# Patient Record
Sex: MALE | Race: BLACK OR AFRICAN AMERICAN | Employment: UNEMPLOYED | URBAN - METROPOLITAN AREA
[De-identification: names, ages, dates, MRNs, and addresses within clinical notes are randomized per-mention and may not be internally consistent; named-entity substitution may affect disease eponyms.]

---

## 2024-07-08 ENCOUNTER — HOSPITAL ENCOUNTER (EMERGENCY)
Age: 54
Discharge: ANOTHER HOSPITAL | End: 2024-07-08
Attending: EMERGENCY MEDICINE
Payer: COMMERCIAL

## 2024-07-08 ENCOUNTER — APPOINTMENT (OUTPATIENT)
Dept: MRI IMAGING | Age: 54
End: 2024-07-08
Payer: COMMERCIAL

## 2024-07-08 ENCOUNTER — APPOINTMENT (OUTPATIENT)
Dept: CT IMAGING | Age: 54
End: 2024-07-08
Payer: COMMERCIAL

## 2024-07-08 ENCOUNTER — HOSPITAL ENCOUNTER (INPATIENT)
Age: 54
LOS: 3 days | Discharge: HOME | End: 2024-07-11
Attending: EMERGENCY MEDICINE | Admitting: PSYCHIATRY & NEUROLOGY
Payer: COMMERCIAL

## 2024-07-08 VITALS
TEMPERATURE: 97.5 F | DIASTOLIC BLOOD PRESSURE: 79 MMHG | BODY MASS INDEX: 21.87 KG/M2 | HEART RATE: 93 BPM | OXYGEN SATURATION: 96 % | WEIGHT: 165 LBS | SYSTOLIC BLOOD PRESSURE: 147 MMHG | RESPIRATION RATE: 19 BRPM | HEIGHT: 73 IN

## 2024-07-08 DIAGNOSIS — I63.9 ISCHEMIC STROKE (HCC): Primary | ICD-10-CM

## 2024-07-08 DIAGNOSIS — I63.512 CEREBROVASCULAR ACCIDENT (CVA) DUE TO OCCLUSION OF LEFT MIDDLE CEREBRAL ARTERY (HCC): Primary | ICD-10-CM

## 2024-07-08 DIAGNOSIS — I63.412 CEREBROVASCULAR ACCIDENT (CVA) DUE TO EMBOLISM OF LEFT MIDDLE CEREBRAL ARTERY (HCC): ICD-10-CM

## 2024-07-08 DIAGNOSIS — I63.412 CEREBROVASCULAR ACCIDENT (CVA) DUE TO EMBOLISM OF LEFT MIDDLE CEREBRAL ARTERY (HCC): Primary | ICD-10-CM

## 2024-07-08 PROBLEM — Z86.79 HISTORY OF ATRIAL FIBRILLATION: Status: ACTIVE | Noted: 2024-07-08

## 2024-07-08 PROBLEM — R79.1 SUBTHERAPEUTIC INTERNATIONAL NORMALIZED RATIO (INR): Status: ACTIVE | Noted: 2024-07-08

## 2024-07-08 LAB
AMPHET UR QL SCN: NEGATIVE
ANION GAP SERPL CALCULATED.3IONS-SCNC: 13 MMOL/L (ref 9–16)
ANION GAP SERPL CALCULATED.3IONS-SCNC: 13 MMOL/L (ref 9–17)
ANTI-XA UNFRAC HEPARIN: 0.2 IU/L
ANTI-XA UNFRAC HEPARIN: 0.3 IU/L
ANTI-XA UNFRAC HEPARIN: <0.1 IU/L
BARBITURATES UR QL SCN: NEGATIVE
BASOPHILS # BLD: <0.03 K/UL (ref 0–0.2)
BASOPHILS # BLD: <0.03 K/UL (ref 0–0.2)
BASOPHILS NFR BLD: 0 % (ref 0–2)
BASOPHILS NFR BLD: 0 % (ref 0–2)
BENZODIAZ UR QL: NEGATIVE
BUN BLD-MCNC: 7 MG/DL (ref 8–26)
BUN SERPL-MCNC: 9 MG/DL (ref 6–20)
BUN SERPL-MCNC: 9 MG/DL (ref 6–20)
BUN/CREAT SERPL: 7 (ref 9–20)
CA-I BLD-SCNC: 1.2 MMOL/L (ref 1.15–1.33)
CALCIUM SERPL-MCNC: 8.8 MG/DL (ref 8.6–10.4)
CALCIUM SERPL-MCNC: 9.1 MG/DL (ref 8.6–10.4)
CANNABINOIDS UR QL SCN: POSITIVE
CHLORIDE BLD-SCNC: 99 MMOL/L (ref 98–107)
CHLORIDE SERPL-SCNC: 101 MMOL/L (ref 98–107)
CHLORIDE SERPL-SCNC: 98 MMOL/L (ref 98–107)
CK SERPL-CCNC: 120 U/L (ref 39–308)
CO2 BLD CALC-SCNC: 30 MMOL/L (ref 22–30)
CO2 SERPL-SCNC: 24 MMOL/L (ref 20–31)
CO2 SERPL-SCNC: 26 MMOL/L (ref 20–31)
COCAINE UR QL SCN: POSITIVE
CREAT SERPL-MCNC: 1.3 MG/DL (ref 0.7–1.2)
CREAT SERPL-MCNC: 1.3 MG/DL (ref 0.7–1.2)
EGFR, POC: 80 ML/MIN/1.73M2
EOSINOPHIL # BLD: 0.06 K/UL (ref 0–0.44)
EOSINOPHIL # BLD: 0.08 K/UL (ref 0–0.44)
EOSINOPHILS RELATIVE PERCENT: 1 % (ref 1–4)
EOSINOPHILS RELATIVE PERCENT: 2 % (ref 1–4)
ERYTHROCYTE [DISTWIDTH] IN BLOOD BY AUTOMATED COUNT: 16.4 % (ref 11.8–14.4)
ERYTHROCYTE [DISTWIDTH] IN BLOOD BY AUTOMATED COUNT: 17.1 % (ref 11.8–14.4)
ERYTHROCYTE [DISTWIDTH] IN BLOOD BY AUTOMATED COUNT: 17.3 % (ref 11.8–14.4)
FENTANYL UR QL: NEGATIVE
GFR, ESTIMATED: 66 ML/MIN/1.73M2
GFR, ESTIMATED: 68 ML/MIN/1.73M2
GLUCOSE BLD-MCNC: 118 MG/DL (ref 74–100)
GLUCOSE BLD-MCNC: 147 MG/DL (ref 75–110)
GLUCOSE SERPL-MCNC: 124 MG/DL (ref 74–99)
GLUCOSE SERPL-MCNC: 130 MG/DL (ref 70–99)
HCO3 VENOUS: 29.9 MMOL/L (ref 22–29)
HCT VFR BLD AUTO: 47.8 % (ref 40.7–50.3)
HCT VFR BLD AUTO: 48.6 % (ref 40.7–50.3)
HCT VFR BLD AUTO: 49.9 % (ref 40.7–50.3)
HCT VFR BLD AUTO: 51 % (ref 41–53)
HGB BLD-MCNC: 15.2 G/DL (ref 13–17)
HGB BLD-MCNC: 15.6 G/DL (ref 13–17)
HGB BLD-MCNC: 15.8 G/DL (ref 13–17)
IMM GRANULOCYTES # BLD AUTO: 0.01 K/UL (ref 0–0.3)
IMM GRANULOCYTES # BLD AUTO: <0.03 K/UL (ref 0–0.3)
IMM GRANULOCYTES NFR BLD: 0 %
IMM GRANULOCYTES NFR BLD: 0 %
INR PPP: 1.1
INR PPP: 1.1
INR PPP: 1.2
LYMPHOCYTES NFR BLD: 0.81 K/UL (ref 1.1–3.7)
LYMPHOCYTES NFR BLD: 0.83 K/UL (ref 1.1–3.7)
LYMPHOCYTES RELATIVE PERCENT: 14 % (ref 24–43)
LYMPHOCYTES RELATIVE PERCENT: 16 % (ref 24–43)
MCH RBC QN AUTO: 25.4 PG (ref 25.2–33.5)
MCH RBC QN AUTO: 25.5 PG (ref 25.2–33.5)
MCH RBC QN AUTO: 25.5 PG (ref 25.2–33.5)
MCHC RBC AUTO-ENTMCNC: 31.7 G/DL (ref 28.4–34.8)
MCHC RBC AUTO-ENTMCNC: 31.8 G/DL (ref 28.4–34.8)
MCHC RBC AUTO-ENTMCNC: 32.1 G/DL (ref 28.4–34.8)
MCV RBC AUTO: 79.5 FL (ref 82.6–102.9)
MCV RBC AUTO: 80.3 FL (ref 82.6–102.9)
MCV RBC AUTO: 80.4 FL (ref 82.6–102.9)
METHADONE UR QL: NEGATIVE
MONOCYTES NFR BLD: 0.28 K/UL (ref 0.1–1.2)
MONOCYTES NFR BLD: 0.35 K/UL (ref 0.1–1.2)
MONOCYTES NFR BLD: 6 % (ref 3–12)
MONOCYTES NFR BLD: 6 % (ref 3–12)
MYOGLOBIN SERPL-MCNC: 72 NG/ML (ref 28–72)
NEUTROPHILS NFR BLD: 76 % (ref 36–65)
NEUTROPHILS NFR BLD: 79 % (ref 36–65)
NEUTS SEG NFR BLD: 3.94 K/UL (ref 1.5–8.1)
NEUTS SEG NFR BLD: 4.88 K/UL (ref 1.5–8.1)
NRBC BLD-RTO: 0 PER 100 WBC
O2 SAT, VEN: 69.8 % (ref 60–85)
OPIATES UR QL SCN: NEGATIVE
OXYCODONE UR QL SCN: NEGATIVE
PARTIAL THROMBOPLASTIN TIME: 26.2 SEC (ref 23–36.5)
PARTIAL THROMBOPLASTIN TIME: 26.5 SEC (ref 23.9–33.8)
PARTIAL THROMBOPLASTIN TIME: 26.6 SEC (ref 23–36.5)
PCO2 VENOUS: 44.2 MM HG (ref 41–51)
PCP UR QL SCN: NEGATIVE
PH VENOUS: 7.44 (ref 7.32–7.43)
PLATELET # BLD AUTO: 240 K/UL (ref 138–453)
PLATELET # BLD AUTO: 245 K/UL (ref 138–453)
PLATELET # BLD AUTO: 259 K/UL (ref 138–453)
PMV BLD AUTO: 9.5 FL (ref 8.1–13.5)
PMV BLD AUTO: 9.6 FL (ref 8.1–13.5)
PMV BLD AUTO: 9.6 FL (ref 8.1–13.5)
PO2 VENOUS: 35.5 MM HG (ref 30–50)
POC ANION GAP: 12 MMOL/L (ref 7–16)
POC CREATININE: 1.1 MG/DL (ref 0.51–1.19)
POC HEMOGLOBIN (CALC): 17.5 G/DL (ref 13.5–17.5)
POC LACTIC ACID: 1 MMOL/L (ref 0.56–1.39)
POSITIVE BASE EXCESS, VEN: 4.8 MMOL/L (ref 0–3)
POTASSIUM BLD-SCNC: 3.7 MMOL/L (ref 3.5–4.5)
POTASSIUM SERPL-SCNC: 3.4 MMOL/L (ref 3.7–5.3)
POTASSIUM SERPL-SCNC: 3.7 MMOL/L (ref 3.7–5.3)
PROTHROMBIN TIME: 14.3 SEC (ref 11.7–14.9)
PROTHROMBIN TIME: 14.5 SEC (ref 11.7–14.9)
PROTHROMBIN TIME: 14.8 SEC (ref 11.5–14.2)
RBC # BLD AUTO: 5.95 M/UL (ref 4.21–5.77)
RBC # BLD AUTO: 6.11 M/UL (ref 4.21–5.77)
RBC # BLD AUTO: 6.21 M/UL (ref 4.21–5.77)
RBC # BLD: ABNORMAL 10*6/UL
RBC # BLD: ABNORMAL 10*6/UL
SODIUM BLD-SCNC: 140 MMOL/L (ref 138–146)
SODIUM SERPL-SCNC: 137 MMOL/L (ref 135–144)
SODIUM SERPL-SCNC: 138 MMOL/L (ref 136–145)
TEST INFORMATION: ABNORMAL
TROPONIN I SERPL HS-MCNC: 12 NG/L (ref 0–22)
WBC OTHER # BLD: 5.1 K/UL (ref 3.5–11.3)
WBC OTHER # BLD: 6.2 K/UL (ref 3.5–11.3)
WBC OTHER # BLD: 6.6 K/UL (ref 3.5–11.3)

## 2024-07-08 PROCEDURE — 2580000003 HC RX 258: Performed by: NURSE PRACTITIONER

## 2024-07-08 PROCEDURE — 93005 ELECTROCARDIOGRAM TRACING: CPT | Performed by: EMERGENCY MEDICINE

## 2024-07-08 PROCEDURE — 85027 COMPLETE CBC AUTOMATED: CPT

## 2024-07-08 PROCEDURE — 70551 MRI BRAIN STEM W/O DYE: CPT

## 2024-07-08 PROCEDURE — 82553 CREATINE MB FRACTION: CPT

## 2024-07-08 PROCEDURE — 99285 EMERGENCY DEPT VISIT HI MDM: CPT

## 2024-07-08 PROCEDURE — 82550 ASSAY OF CK (CPK): CPT

## 2024-07-08 PROCEDURE — 85520 HEPARIN ASSAY: CPT

## 2024-07-08 PROCEDURE — 82947 ASSAY GLUCOSE BLOOD QUANT: CPT

## 2024-07-08 PROCEDURE — 83874 ASSAY OF MYOGLOBIN: CPT

## 2024-07-08 PROCEDURE — 70498 CT ANGIOGRAPHY NECK: CPT

## 2024-07-08 PROCEDURE — 83605 ASSAY OF LACTIC ACID: CPT

## 2024-07-08 PROCEDURE — 6360000002 HC RX W HCPCS: Performed by: NURSE PRACTITIONER

## 2024-07-08 PROCEDURE — 85730 THROMBOPLASTIN TIME PARTIAL: CPT

## 2024-07-08 PROCEDURE — 85610 PROTHROMBIN TIME: CPT

## 2024-07-08 PROCEDURE — 6360000004 HC RX CONTRAST MEDICATION: Performed by: EMERGENCY MEDICINE

## 2024-07-08 PROCEDURE — 0042T CT BRAIN PERFUSION: CPT

## 2024-07-08 PROCEDURE — 70450 CT HEAD/BRAIN W/O DYE: CPT

## 2024-07-08 PROCEDURE — 84520 ASSAY OF UREA NITROGEN: CPT

## 2024-07-08 PROCEDURE — 82330 ASSAY OF CALCIUM: CPT

## 2024-07-08 PROCEDURE — 80051 ELECTROLYTE PANEL: CPT

## 2024-07-08 PROCEDURE — 2580000003 HC RX 258: Performed by: EMERGENCY MEDICINE

## 2024-07-08 PROCEDURE — 80048 BASIC METABOLIC PNL TOTAL CA: CPT

## 2024-07-08 PROCEDURE — 82565 ASSAY OF CREATININE: CPT

## 2024-07-08 PROCEDURE — 6370000000 HC RX 637 (ALT 250 FOR IP): Performed by: STUDENT IN AN ORGANIZED HEALTH CARE EDUCATION/TRAINING PROGRAM

## 2024-07-08 PROCEDURE — 80307 DRUG TEST PRSMV CHEM ANLYZR: CPT

## 2024-07-08 PROCEDURE — 93005 ELECTROCARDIOGRAM TRACING: CPT

## 2024-07-08 PROCEDURE — 2580000003 HC RX 258

## 2024-07-08 PROCEDURE — 6360000002 HC RX W HCPCS

## 2024-07-08 PROCEDURE — 85014 HEMATOCRIT: CPT

## 2024-07-08 PROCEDURE — 36415 COLL VENOUS BLD VENIPUNCTURE: CPT

## 2024-07-08 PROCEDURE — 94761 N-INVAS EAR/PLS OXIMETRY MLT: CPT

## 2024-07-08 PROCEDURE — 92523 SPEECH SOUND LANG COMPREHEN: CPT

## 2024-07-08 PROCEDURE — 85025 COMPLETE CBC W/AUTO DIFF WBC: CPT

## 2024-07-08 PROCEDURE — 6370000000 HC RX 637 (ALT 250 FOR IP): Performed by: NURSE PRACTITIONER

## 2024-07-08 PROCEDURE — 84484 ASSAY OF TROPONIN QUANT: CPT

## 2024-07-08 PROCEDURE — 2000000000 HC ICU R&B

## 2024-07-08 PROCEDURE — 99223 1ST HOSP IP/OBS HIGH 75: CPT | Performed by: PSYCHIATRY & NEUROLOGY

## 2024-07-08 PROCEDURE — 82803 BLOOD GASES ANY COMBINATION: CPT

## 2024-07-08 RX ORDER — ONDANSETRON 4 MG/1
4 TABLET, ORALLY DISINTEGRATING ORAL EVERY 8 HOURS PRN
Status: DISCONTINUED | OUTPATIENT
Start: 2024-07-08 | End: 2024-07-11 | Stop reason: HOSPADM

## 2024-07-08 RX ORDER — SODIUM CHLORIDE 9 MG/ML
INJECTION, SOLUTION INTRAVENOUS CONTINUOUS
Status: DISCONTINUED | OUTPATIENT
Start: 2024-07-08 | End: 2024-07-09

## 2024-07-08 RX ORDER — ASPIRIN 81 MG/1
81 TABLET, CHEWABLE ORAL DAILY
Status: DISCONTINUED | OUTPATIENT
Start: 2024-07-09 | End: 2024-07-11 | Stop reason: HOSPADM

## 2024-07-08 RX ORDER — HEPARIN SODIUM 1000 [USP'U]/ML
4000 INJECTION, SOLUTION INTRAVENOUS; SUBCUTANEOUS ONCE
Status: COMPLETED | OUTPATIENT
Start: 2024-07-08 | End: 2024-07-08

## 2024-07-08 RX ORDER — SODIUM CHLORIDE 9 MG/ML
INJECTION, SOLUTION INTRAVENOUS PRN
Status: DISCONTINUED | OUTPATIENT
Start: 2024-07-08 | End: 2024-07-11 | Stop reason: HOSPADM

## 2024-07-08 RX ORDER — HEPARIN SODIUM 1000 [USP'U]/ML
2000 INJECTION, SOLUTION INTRAVENOUS; SUBCUTANEOUS PRN
Status: DISCONTINUED | OUTPATIENT
Start: 2024-07-08 | End: 2024-07-10

## 2024-07-08 RX ORDER — SODIUM CHLORIDE 0.9 % (FLUSH) 0.9 %
10 SYRINGE (ML) INJECTION PRN
Status: DISCONTINUED | OUTPATIENT
Start: 2024-07-08 | End: 2024-07-08 | Stop reason: HOSPADM

## 2024-07-08 RX ORDER — ASPIRIN 81 MG/1
324 TABLET, CHEWABLE ORAL ONCE
Status: COMPLETED | OUTPATIENT
Start: 2024-07-08 | End: 2024-07-08

## 2024-07-08 RX ORDER — HEPARIN SODIUM 10000 [USP'U]/100ML
5-30 INJECTION, SOLUTION INTRAVENOUS CONTINUOUS
Status: DISPENSED | OUTPATIENT
Start: 2024-07-08 | End: 2024-07-10

## 2024-07-08 RX ORDER — ONDANSETRON 2 MG/ML
4 INJECTION INTRAMUSCULAR; INTRAVENOUS EVERY 6 HOURS PRN
Status: DISCONTINUED | OUTPATIENT
Start: 2024-07-08 | End: 2024-07-11 | Stop reason: HOSPADM

## 2024-07-08 RX ORDER — POLYETHYLENE GLYCOL 3350 17 G/17G
17 POWDER, FOR SOLUTION ORAL DAILY PRN
Status: DISCONTINUED | OUTPATIENT
Start: 2024-07-08 | End: 2024-07-11 | Stop reason: HOSPADM

## 2024-07-08 RX ORDER — 0.9 % SODIUM CHLORIDE 0.9 %
500 INTRAVENOUS SOLUTION INTRAVENOUS ONCE
Status: COMPLETED | OUTPATIENT
Start: 2024-07-08 | End: 2024-07-08

## 2024-07-08 RX ORDER — ATORVASTATIN CALCIUM 80 MG/1
80 TABLET, FILM COATED ORAL NIGHTLY
Status: DISCONTINUED | OUTPATIENT
Start: 2024-07-08 | End: 2024-07-11 | Stop reason: HOSPADM

## 2024-07-08 RX ORDER — HEPARIN SODIUM 1000 [USP'U]/ML
4000 INJECTION, SOLUTION INTRAVENOUS; SUBCUTANEOUS PRN
Status: DISCONTINUED | OUTPATIENT
Start: 2024-07-08 | End: 2024-07-10

## 2024-07-08 RX ORDER — SODIUM CHLORIDE 0.9 % (FLUSH) 0.9 %
5-40 SYRINGE (ML) INJECTION PRN
Status: DISCONTINUED | OUTPATIENT
Start: 2024-07-08 | End: 2024-07-11 | Stop reason: HOSPADM

## 2024-07-08 RX ORDER — 0.9 % SODIUM CHLORIDE 0.9 %
80 INTRAVENOUS SOLUTION INTRAVENOUS ONCE
Status: DISCONTINUED | OUTPATIENT
Start: 2024-07-08 | End: 2024-07-08 | Stop reason: HOSPADM

## 2024-07-08 RX ORDER — SODIUM CHLORIDE 0.9 % (FLUSH) 0.9 %
5-40 SYRINGE (ML) INJECTION EVERY 12 HOURS SCHEDULED
Status: DISCONTINUED | OUTPATIENT
Start: 2024-07-08 | End: 2024-07-11 | Stop reason: HOSPADM

## 2024-07-08 RX ADMIN — ASPIRIN 81 MG 324 MG: 81 TABLET ORAL at 08:53

## 2024-07-08 RX ADMIN — SODIUM CHLORIDE 500 ML: 0.9 INJECTION, SOLUTION INTRAVENOUS at 10:32

## 2024-07-08 RX ADMIN — SODIUM CHLORIDE, PRESERVATIVE FREE 10 ML: 5 INJECTION INTRAVENOUS at 19:40

## 2024-07-08 RX ADMIN — IOPAMIDOL 50 ML: 755 INJECTION, SOLUTION INTRAVENOUS at 10:07

## 2024-07-08 RX ADMIN — Medication 80 ML: at 08:24

## 2024-07-08 RX ADMIN — HEPARIN SODIUM 4000 UNITS: 1000 INJECTION INTRAVENOUS; SUBCUTANEOUS at 12:06

## 2024-07-08 RX ADMIN — SODIUM CHLORIDE, PRESERVATIVE FREE 10 ML: 5 INJECTION INTRAVENOUS at 08:24

## 2024-07-08 RX ADMIN — HEPARIN SODIUM 12 UNITS/KG/HR: 10000 INJECTION, SOLUTION INTRAVENOUS at 12:06

## 2024-07-08 RX ADMIN — ATORVASTATIN CALCIUM 80 MG: 80 TABLET, FILM COATED ORAL at 19:40

## 2024-07-08 RX ADMIN — HEPARIN SODIUM 2000 UNITS: 1000 INJECTION INTRAVENOUS; SUBCUTANEOUS at 19:18

## 2024-07-08 RX ADMIN — IOPAMIDOL 75 ML: 755 INJECTION, SOLUTION INTRAVENOUS at 08:23

## 2024-07-08 ASSESSMENT — PAIN SCALES - GENERAL: PAINLEVEL_OUTOF10: 0

## 2024-07-08 ASSESSMENT — ENCOUNTER SYMPTOMS
ALLERGIC/IMMUNOLOGIC NEGATIVE: 1
TROUBLE SWALLOWING: 1
EYES NEGATIVE: 1
RESPIRATORY NEGATIVE: 1
GASTROINTESTINAL NEGATIVE: 1

## 2024-07-08 NOTE — CONSULTS
Endovascular Neurosurgery Consult    Pt Name: Alberto Torres  MRN: [id number]  YOB: 1970  Date of evaluation: 7/8/2024  Primary Care Physician: No primary care provider on file.  Patient evaluated at the request of  Dr. Don Godoy  Reason for evaluation: L M2 occlusion    SUBJECTIVE:   History of Chief Complaint:    The patient is a 53 y.o. left handed male who presents with slurred speech upon waking this morning. Patient went to bed around 11 pm last night and awoke with slurred speech. PMH of hypertension and afib on coumadin with noncompliance. Patient went to his cariology appointment this morning. Was instructed to go to the emergency department for evaluation. He presented to [facility]. Initial NIHSS 2 for R facial droop and dysarthria. CTH showed a hyperdense L MCA sign, CTA head and neck positive for L MCA M2 occlusion. INR 1.2. Transferred to [facility] for further assessment by endovascular neurosurgery.      On arrival patient's SBP was 112/84, blood glucose 118. NIH 3 for R facial droop, dysarthria and mild aphasia. CTP showed a small core with 5cc mismatch. Symptoms began to improve with 500cc NS bolus.        Allergies  has No Known Allergies.  Medications  Prior to Admission medications    Medication Sig Start Date End Date Taking? Authorizing Provider   doxycycline (MONODOX) 100 MG capsule Take 1 capsule by mouth 2 times daily 7/14/15   Dale Feliciano MD   lisinopril (PRINIVIL;ZESTRIL) 10 MG tablet Take 1 tablet by mouth daily 7/14/15   Dale Feliciano MD   BusPIRone HCl (BUSPAR PO) Take  by mouth. unknown    Provider, MD Nereida   ipratropium (ATROVENT) 0.03 % nasal spray 2 sprays by Nasal route 2 times daily. 10/23/13   Juanito Arthur DO   guaiFENesin (MUCINEX) 600 MG SR tablet Take 1 tablet by mouth 2 times daily. 10/23/13   Juanito Arthur DO    Scheduled Meds:   [START ON 7/9/2024] aspirin  81 mg Oral Daily    sodium chloride flush  5-40 mL IntraVENous 2

## 2024-07-08 NOTE — PROGRESS NOTES
SLP ALL NOTES  Facility/Department: [address] NEURO ICU  Initial Speech/Language/Cognitive Assessment    NAME: Alberto Torres  : 1970   MRN: [id number]  ADMISSION DATE: 2024  ADMITTING DIAGNOSIS: has Acute ischemic left MCA stroke (HCC) on their problem list.      Date of Eval: 2024   Evaluating Therapist: SIERRA COCHRAN    RECENT RESULTS  CT OF HEAD/MRI:   IMPRESSION: 24  Suspected subtle small acute infarcts within the left frontotemporal lobe and  left external capsule    Primary Complaint:   The patient is a 53 y.o. left-handed male who presented with slurred speech upon waking this morning. Patient went to bed around 11 pm last night and awoke today at 6 am with slurred speech. PMH of hypertension and afib on coumadin with noncompliance. Patient went to his cardiology appointment this morning. Was instructed to go to the emergency department for evaluation. He presented to [facility]. Initial NIHSS 2 for R facial droop and dysarthria. CTH showed a hyperdense left M2, CTA head and neck positive for L MCA M2 occlusion. INR 1.2. Transferred to [facility] for further assessment by endovascular neurosurgery.     Pain:  Pain Assessment  Pain Assessment: 0-10  Pain Level: 0    Vision/ Hearing  Vision  Vision: Within Functional Limits  Hearing  Hearing: Within functional limits    Assessment:      Pt presents with mild cognitive deficits characterized by difficulties with word associations, antonyms, inductive reasoning, and deductive reasoning tasks.   Pt. Presents with moderate dysarthria, right sided facial droop and weakness O/M deficits at this time resulting in blended word boundaries in connected speech. ST to follow up and provide treatment to address noted deficits. Education provided.          Recommendations:  Recommendations  Requires SLP Intervention: Yes  Patient Education: Yes  Patient Education Response: Verbalizes understanding     D/C Recommendations: Ongoing  Limits  Insight: WFL (3/3)  Flexibility of Thought: WFL (3/3)      Prognosis:  Speech Therapy Prognosis  Prognosis: Good  Prognosis Considerations: Age;Severity of Impairments;Family/Community Support;Motivation  Individuals consulted  Consulted and agree with results and recommendations: Patient;Family member  Family member consulted: Girlfriend and sister    Education:  Patient Education: Yes  Patient Education Response: Verbalizes understanding          Therapy Time:   Individual Concurrent Group Co-treatment   Time In  1400         Time Out  1417         Minutes  17                 Completed by Rita Joe  Clinician    Co-signed by Alicia Iniguez M.A.CCC/SLP

## 2024-07-08 NOTE — PROGRESS NOTES
[facility]     Emergency/Trauma Note    PATIENT NAME: Alberto Torres    Shift date: 7/8/2024   Shift day: Monday   Shift # 1    Room # 19/19   Name: Alberto Torres            Age: 53 y.o.  Gender: male          Church: Other   Place of [employer]:     Trauma/Incident type: Stroke Alert  Admit Date & Time: 7/8/2024  9:51 AM      PATIENT/EVENT DESCRIPTION:  Alberto Torres is a 53 y.o. male who arrived as a transfer from [facility] as a stroke alert. Pt to be admitted to 19/19.         SPIRITUAL ASSESSMENT-INTERVENTION-OUTCOME:  Pt arrived with significant other, Giulia. She said that they are engaged. SO explained pt's recent behavior that she noticed and expressed guilt for not taking pt to the hospital sooner. Pt's speech was slurred. Writer unable to assess pt.  Writer provided support to SO through active listening and words of affirmation. Writer offered hospitality.     PATIENT BELONGINGS:  No belongings noted    ANY BELONGINGS OF SIGNIFICANT VALUE NOTED:      REGISTRATION STAFF NOTIFIED?  No      WHAT IS YOUR SPIRITUAL CARE PLAN FOR THIS PATIENT?:   Chaplains will remain available to offer spiritual and emotional support as needed.     Electronically signed by Chaplain Herminia, on 7/8/2024 at 11:15 AM.  [facility]  [phone]

## 2024-07-08 NOTE — ED PROVIDER NOTES
[facility] ED     Emergency Department     Faculty Attestation    I performed a history and physical examination of the patient and discussed management with the resident. I reviewed the resident’s note and agree with the documented findings and plan of care. Any areas of disagreement are noted on the chart. I was personally present for the key portions of any procedures. I have documented in the chart those procedures where I was not present during the key portions. I have reviewed the emergency nurses triage note. I agree with the chief complaint, past medical history, past surgical history, allergies, medications, social and family history as documented unless otherwise noted below. For Physician Assistant/ Nurse Practitioner cases/documentation I have personally evaluated this patient and have completed at least one if not all key elements of the E/M (history, physical exam, and MDM). Additional findings are as noted.    Note Started: 10:13 AM EDT    Patient transferred from [facility] for stroke evaluation.  Symptoms began approximately 1 PM last night presented this morning with dysarthria aphasia and facial droop.  Found to have a possible M2 occlusion on CTA transferred here for stroke eval including CT perfusion.  On arrival does have significant dysarthria but no airway compromise no lip tongue or facial swelling no respiratory distress.  Accompanied by his wife who provides independent history.  Stroke team already at bedside taken to CT for perfusion study, possible lab for mechanical thrombectomy.    EKG interpretation: Atrial fibrillation 97 normal intervals normal axis no acute ST or T changes.  No acute findings    Critical Care     CRITICAL CARE: There was a high probability of clinically significant/life threatening deterioration in this patient's condition which required my urgent intervention.  Total critical care time was less than 30 minutes.  This excludes any time for  separately reportable procedures.       Rolo Chan MD, FACEP, FAAEM  Attending Emergency  Physician           Rolo Chan MD  07/08/24 1041

## 2024-07-08 NOTE — RESEARCH
Study: DISTALS - Distal Ischemic Stroke Treatment with Adjustable Low-profile Stentriever  Protocol Number [id number]  NCT Number: [id number]  Site PI: Dr. Daniel Alvarado     Patient Name : Alberto Torres  MRN : [id number]   : 28 AUG 1970  Date : 2024  Time :  10:45 am  Reason for evaluation : baseline assessment  Subject ID: DUS - [id number] - [id number]        Pre-stroke mRS at baseline:    0 - No symptoms at all.     mRS at admission:    2 - Slight disability:  unable to carry out all previous activities, but able to look after own affairs without assistance.        Qualifying CTP performed on 2024 reviewed with PI Dr. Alvarado and sub-investigator Dr. Coleman.    Perfusion lesion volume: 15 cc    Ischemic core lesion volume: 0 cc    Vessel diameter at occlusion location: 1.5 mm          Patient agrees to continue participation in the study. We will continue to follow the patient and monitor for any issues throughout the study. Please contact Clinical Research Services at [phone] or Dr. Alvarado at [phone] with any questions.    Jennie Sanches RN  [facility] Clinical Research Department  [address] [place] [address]  P: [phone]  F: [phone]

## 2024-07-08 NOTE — ED PROVIDER NOTES
[facility] ED  Emergency Department Encounter  Emergency Medicine Resident     Pt Name:Alberto Torres  MRN: [id number]  Birthdate 1970  Date of evaluation: 7/8/24  PCP:  No primary care provider on file.  Note Started: 10:01 AM EDT      CHIEF COMPLAINT       Chief Complaint   Patient presents with    Aphasia    Facial Droop     Right sided       HISTORY OF PRESENT ILLNESS  (Location/Symptom, Timing/Onset, Context/Setting, Quality, Duration, Modifying Factors, Severity.)      Alberto Torres is a 53 y.o. male with PMH of anxiety, A-fib, bipolar disorder, depression, HTN who presents as transfer from OSH for strokelike symptoms.  Per OSH charting patient's last known well was 11 PM last night and woke up with dysarthria, difficulty swallowing, changes in speech.  Patient had cardiology appointment this morning and MD noted abnormal slurring of speech, initial imaging at OSH shows suspected small M2 branch occlusion with a left MCA within the sylvian fissure.  Patient sent to [facility] for CT perfusion scan.  Stroke team at bedside on arrival to the ED. patient is on Coumadin however he has not been consistent in taking it nor following up with Coumadin clinic.  NIH score of 2 for dysarthria and subtle facial droop to right side.    PAST MEDICAL / SURGICAL / SOCIAL / FAMILY HISTORY      has a past medical history of Anxiety, Atrial fibrillation (HCC), Bipolar 1 disorder (HCC), Depression, Hypertension, and Lactose intolerance.       has no past surgical history on file.      Social History     Socioeconomic History    Marital status: Single     Spouse name: Not on file    Number of children: Not on file    Years of education: Not on file    Highest education level: Not on file   Occupational History    Not on file   Tobacco Use    Smoking status: Every Day     Current packs/day: 0.50     Average packs/day: 0.5 packs/day for 26.0 years (13.0 ttl pk-yrs)     Types: Cigarettes    Smokeless tobacco:  Nose normal.      Mouth/Throat:      Mouth: Mucous membranes are moist.   Eyes:      Extraocular Movements: Extraocular movements intact.      Pupils: Pupils are equal, round, and reactive to light.   Cardiovascular:      Rate and Rhythm: Normal rate and regular rhythm.      Pulses: Normal pulses.      Heart sounds: Normal heart sounds.   Pulmonary:      Effort: Pulmonary effort is normal.      Breath sounds: Normal breath sounds.   Abdominal:      General: Abdomen is flat.      Palpations: Abdomen is soft.   Musculoskeletal:         General: Normal range of motion.   Skin:     General: Skin is warm.      Capillary Refill: Capillary refill takes less than 2 seconds.   Neurological:      Mental Status: He is alert.      Cranial Nerves: Cranial nerve deficit present.      Comments: Subtle right facial droop, dysarthria obvious, slurred/lower speech   Psychiatric:         Mood and Affect: Mood normal.           DDX/DIAGNOSTIC RESULTS / EMERGENCY DEPARTMENT COURSE / MDM     Medical Decision Making  53 y.o. male with PMH of anxiety, A-fib, bipolar disorder, depression, HTN who presents as transfer from OSH for strokelike symptoms.  Per OSH charting patient's last known well was 11 PM last night and woke up with dysarthria, difficulty swallowing, changes in speech.  Patient had cardiology appointment this morning and MD noted abnormal slurring of speech, initial imaging at OSH shows suspected small M2 branch occlusion with a left MCA within the sylvian fissure.  Patient sent to [facility] for CT perfusion scan.  Stroke team at bedside on arrival to the ED.    Given patient PMH, HPI, physical exam differential diagnosis includes stroke, TIA, toxic ingestion, alcohol inebriation, tumor, seizures, and electrolyte derangement.  Patient has initial head CT and CTA of head and neck, will proceed with CT perfusion scan per stroke team.  Stroke panel ordered, will collaborate with stroke team for any additional management, EKG

## 2024-07-08 NOTE — ED PROVIDER NOTES
EMERGENCY DEPARTMENT ENCOUNTER    Pt Name: Alberto Torres  MRN: [id number]  Birthdate 1970  Date of evaluation: 7/8/24  CHIEF COMPLAINT       Chief Complaint   Patient presents with    Aphasia    Facial Droop     HISTORY OF PRESENT ILLNESS   This is a 53-year-old that presents with complaints of stroke symptoms.  Patient went to bed last night at 11 PM and woke up this morning with symptoms at 6 AM.  Patient has symptoms of some dysarthria and mild difficulty getting her speech out.  Patient has no history of stroke, does have a history of atrial fibrillation, on Coumadin.  Patient woke up this morning and went to his cardiology office, the cardiologist noticed his speech being abnormal and sent him here for further evaluation.           REVIEW OF SYSTEMS     Review of Systems  PASTMEDICAL HISTORY     Past Medical History:   Diagnosis Date    Anxiety     Atrial fibrillation (HCC)     Bipolar 1 disorder (HCC)     Depression     Hypertension     Lactose intolerance      Past Problem List  There is no problem list on file for this patient.    SURGICAL HISTORY     History reviewed. No pertinent surgical history.  CURRENT MEDICATIONS       Previous Medications    BUSPIRONE HCL (BUSPAR PO)    Take  by mouth. unknown    DOXYCYCLINE (MONODOX) 100 MG CAPSULE    Take 1 capsule by mouth 2 times daily    GUAIFENESIN (MUCINEX) 600 MG SR TABLET    Take 1 tablet by mouth 2 times daily.    IPRATROPIUM (ATROVENT) 0.03 % NASAL SPRAY    2 sprays by Nasal route 2 times daily.    LISINOPRIL (PRINIVIL;ZESTRIL) 10 MG TABLET    Take 1 tablet by mouth daily     ALLERGIES     has No Known Allergies.  FAMILY HISTORY     has no family status information on file.      SOCIAL HISTORY       Social History     Tobacco Use    Smoking status: Every Day     Current packs/day: 0.50     Average packs/day: 0.5 packs/day for 26.0 years (13.0 ttl pk-yrs)     Types: Cigarettes    Smokeless tobacco: Current   Substance Use Topics    Alcohol use: Yes

## 2024-07-08 NOTE — RESEARCH
Study: DISTALS - Distal Ischemic Stroke Treatment with Adjustable Low-profile Stentriever  Protocol Number [id number]  NCT Number: [id number]  Site PI: Dr. Daniel Alvarado       Patient Name: Alberto Torres  MRN: [id number]  YOB: 1970  Date of evaluation: 7/8/2024  Time of evaluation:  10:37 am  Reason for evaluation: Consent      Inclusion/Exclusion Criteria:  Alberto Torres met initial inclusion and exclusion criteria as verified by Dr. Alvarado, Dr. Duffy and Jeannette Huff and all required standard of care tests have been performed.     Treatment/Alternatives/Risks:  AUGUSTINE Alvarado verified the study treatments, risks, alternative treatments, and expected outcomes with the patient.     Consent:  Mr. Alberto Torres was provided with a copy of the informed consent form at 10:38. He read the consent and all of their questions were answered. He wishes to participate in the study. Informed consent signed by Dr. Alvarado and Jeannette Gray along with the patient and a copy of the signed consent form was given to the patient for their records.       Enrollment:  Subjects are considered enrolled in the DISTALS Study after having met all inclusion/exclusion criteria, being properly consented, and having been allocated to EVT treatment with Tigertriever 13 in the pre-randomization phase, lead-in, or randomized to the Treatment or Control arm in the randomized phase.     Questions:  Please contact the Research Nurse at [phone] or Dr. Alvarado at [phone]  with any questions.    Daniel Alvarado MD  Clinical Research Department  [address] [place] [address]  P: [phone]  F: [phone]

## 2024-07-08 NOTE — ED NOTES
Pt to ed via ems transfer from [facility] with complaints of dysarthria and right sided facial droop  Pt LKW last night at 11pm  Pt woke up this morning drooling w a r. Facial droop  Pt taken to [facility]  Cts completed there, to [facility] for CT P  Pt states hx of afib on coumadin, took medication yesterday but not always compliant with daily doses  Pt denies having any pain  Pt denies any recent traumas/ falls  Pt denies having any other complaints  Neuro team at bedside upon pts arrival  Pts family member at bedside   Pt placed on cont cardiac monitor, ekg completed, iv established, labs drawn, labeled and will send to lab via orders  Pt alert and oriented x4, talking in complete sentences, respirations even and unlabored. Pt acting age appropriate. White board updated, will continue to plan of care

## 2024-07-08 NOTE — ED NOTES
0802  Alberto Torres  Awoke with aphasia right facial droop  Hyperdense MCA with small M2 occlusion  On Coumadin for Afib, INR 1.2  Needs CTP, probable lab  Awake and alert  Dr. Alvarado

## 2024-07-08 NOTE — H&P
Neuro ICU History & Physical    Patient Name: Alberto Torres  Patient : 1970  Room/Bed: 0117/0117-01  Code Status: Full  Allergies: No Known Allergies    CHIEF COMPLAINT     Slurred speech, face asymmetry    HPI    History Obtained From: Patient, family at bedside, medical records    The patient is a 53 y.o. left-handed male who presented with slurred speech upon waking this morning. Patient went to bed around 11 pm last night and awoke today at 6 am with slurred speech. PMH of hypertension and afib on coumadin with noncompliance. Patient went to his cardiology appointment this morning. Was instructed to go to the emergency department for evaluation. He presented to [facility]. Initial NIHSS 2 for R facial droop and dysarthria. CTH showed a hyperdense left M2, CTA head and neck positive for L MCA M2 occlusion. INR 1.2. Transferred to [facility] for further assessment by endovascular neurosurgery.     Admitted to ICU From: ED   Reason for ICU Admission: Acute ischemic stroke, possible endovascular intervention       PATIENT HISTORY   Past Medical History:        Diagnosis Date    Anxiety     Atrial fibrillation (HCC)     Bipolar 1 disorder (HCC)     Clinical trial participant 2024    enrolled in DISTALS trial - anticipated completion 2024    Depression     Hypertension     Lactose intolerance        Past Surgical History:    No past surgical history on file.    Social History:   Social History     Socioeconomic History    Marital status: Single     Spouse name: Not on file    Number of children: Not on file    Years of education: Not on file    Highest education level: Not on file   Occupational History    Not on file   Tobacco Use    Smoking status: Every Day     Current packs/day: 0.50     Average packs/day: 0.5 packs/day for 26.0 years (13.0 ttl pk-yrs)     Types: Cigarettes    Smokeless tobacco: Current   Substance and Sexual Activity    Alcohol use: Yes     Comment:  pain, negative for extremity pain   NEUROLOGICAL: Negative for seizures   PSYCHIATRIC: negative for agitation, hallucination, SI/HI   SKIN Negative for spontaneous contusions, rashes, or lesions      PHYSICAL EXAM:     /88   Pulse 96   Temp 98.5 °F (36.9 °C) (Oral)   Resp 12   SpO2 95%     PHYSICAL EXAM:  CONSTITUTIONAL:  Well developed, well nourished, alert and oriented x 3, in no acute distress. GCS 15. Nontoxic.   HEAD:  normocephalic, atraumatic    EYES:  PERRLA, EOMI.   ENT:  moist mucous membranes   LUNGS:  Equal air entry bilaterally   CARDIOVASCULAR:  normal s1 / s2   ABDOMEN:  Soft, no rigidity; slight tenderness in lower abdomen upon palpation in RUQ   NECK supple, symmetric, no midline tenderness to palpation    BACK without midline tenderness, step-offs or deformities    EXTREMITIES Normal ROM with no deformities on gross exam   NEUROLOGIC:  Mental Status:  A & O x3,awake             Cranial Nerves:      II: Visual fields:  normal  III: pupils are round, reactive to light, symmetric, diameter ~4mm in dark room  III, IV, VI: extraocular movements intact, no nystagmus  V: sensation to light touch intact on face   VII: upper face symmetric, right nasolabial fold slightly effaced  IIIX: hearing grossly intact  IX, X: passed swallow eval  XI: raises shoulders symmetrically; strength intact  XII: tongue symmetric    Motor Exam:    Drift:  absent  Tone:  normal    Motor exam is symmetrical 5 out of 5 all extremities bilaterally    Sensory:    Touch:    Right Upper Extremity:  normal  Left Upper Extremity:  normal  Right Lower Extremity:  normal  Left Lower Extremity:  normal    Deep Tendon Reflexes:    Right Bicep:  2+  Left Bicep:  2+  Right Knee:  2+  Left Knee:  2+    Plantar Response:  Right:  downgoing  Left:  downgoing    Clonus:  absent  Adamson's:  absent    Coordination/Dysmetria:  Heel to Shin:  Right:  normal  Left:  normal  Finger to Nose:   Right:  normal  Left:  normal

## 2024-07-08 NOTE — CONSULTS
Department of Neurology/Telestroke/Stroke  Resident Consult Note  Stroke Alert @ 9:38 am (ETA 7 min)  Arrival at bedside @ 9:47 am    Reason for Consult:  ED Stroke Alert  Requesting Physician:  Dr. Rolo Chan  Endovascular Neurosurgeon:   Daniel Alvarado MD    Stroke Team:  Daniel Alvarado MD      History Obtained From:  patient    CHIEF COMPLAINT:       Slurred speech    HISTORY OF PRESENT ILLNESS:       The patient is a 53 y.o. left handed male who presents with slurred speech upon waking this morning. Patient went to bed around 11 pm last night and awoke with slurred speech. PMH of hypertension and afib on coumadin with noncompliance. Patient went to his cariology appointment this morning. Was instructed to go to the emergency department for evaluation. He presented to [facility]. Initial NIHSS 2 for R facial droop and dysarthria. CTH showed a hyperdense L MCA sign, CTA head and neck positive for L MCA M2 occlusion. INR 1.2. Transferred to [facility] for further assessment by endovascular neurosurgery.     On arrival patient's SBP was 112/84, blood glucose 118. NIH 3 for R facial droop, dysarthria and mild aphasia. CTP showed a small core with 5cc mismatch. Symptoms began to improve with 500cc NS bolus.         LKW: 11pm 7/7/2024  NIHSS: 3  Modified Eldon Scale:0  SBP:112  Glucose:118  CT head without contrast: L hyperdense MCA sign  TNK: Not a candidate due to being outside the window  Endovascular: consulted for L M2 MCA occlusion       PAST MEDICAL HISTORY :       Past Medical History:        Diagnosis Date    Anxiety     Atrial fibrillation (HCC)     Bipolar 1 disorder (HCC)     Depression     Hypertension     Lactose intolerance        Past Surgical History:    No past surgical history on file.    Social History:   Social History     Socioeconomic History    Marital status: Single     Spouse name: Not on file    Number of children: Not on file    Years of education: Not on file      Randomized to medical management group for DISTALS  No  [] due to the following exclusion criteria:       Disposition   [] General Neurology Care Status - prefer 1st floor (1C)   [] Internal Medicine General Care Status   [x] NICU Status - (1B)     [] MICU Status   [] Observation Status    Stroke admission order set:  [] [id number] - OTILIO Intercerebral Hemorrhage Admission  [] [id number] - OTILIO Sub Arachnoid Hemorrhage Admission  [] [id number] - OTILIO Ischemic Stroke TPA Treatment Focused  [] [id number] - IP Ischemic Stroke ICU Post Alteplase (TPA) Admission   [x] [id number] - GEN Ischemic Stroke Non-Thrombolytic Focused      Plan:       Imaging  Stat MRI brain limited  Stat TTE to assess for mural thrombus    Medications  -  now, continue 81 mg daily  - Full dose heparin   - Crestor 40 mg nightly    Labs  - Fasting Lipid Panel - less than 70  - If no recent a1c, order a1c.  A1c DM Goal: <7.0%    Orders  - PT, OT, Speech eval, PMR c/s  - Telemetry   - Neuro checks per protocol  - We recommend SBP <130-180  - Blood glucose goal less than 180      - Discussed with Jazzy Duffy MD    Additional recommendations may follow    Please contact EV NSG or telestroke with any changes in patients neurologic status.           Abhijeet Gray MD   7/8/2024  11:24 AM

## 2024-07-08 NOTE — VIRTUAL HEALTH
(PRINIVIL;ZESTRIL) 10 MG tablet Take 1 tablet by mouth daily 7/14/15   Dale Feliciano MD   BusPIRone HCl (BUSPAR PO) Take  by mouth. unknown    Provider, MD Nereida   ipratropium (ATROVENT) 0.03 % nasal spray 2 sprays by Nasal route 2 times daily. 10/23/13   Juanito Arthur DO   guaiFENesin (MUCINEX) 600 MG SR tablet Take 1 tablet by mouth 2 times daily. 10/23/13   Juanito Arthur DO    Scheduled Meds:   sodium chloride  80 mL IntraVENous Once     Continuous Infusions:  PRN Meds:.sodium chloride flush  Past Medical History   has a past medical history of Anxiety, Atrial fibrillation (HCC), Bipolar 1 disorder (HCC), Depression, Hypertension, and Lactose intolerance.  Social History  Social History     Socioeconomic History    Marital status: Single     Spouse name: Not on file    Number of children: Not on file    Years of education: Not on file    Highest education level: Not on file   Occupational History    Not on file   Tobacco Use    Smoking status: Every Day     Current packs/day: 0.50     Average packs/day: 0.5 packs/day for 26.0 years (13.0 ttl pk-yrs)     Types: Cigarettes    Smokeless tobacco: Current   Substance and Sexual Activity    Alcohol use: Yes     Comment: daily    Drug use: Yes     Types: Cocaine, Marijuana (Weed)     Comment: last cocaine use 4/27/2013    Sexual activity: Yes   Other Topics Concern    Not on file   Social History Narrative    Not on file     Social Determinants of Health     Financial Resource Strain: Not on file   Food Insecurity: Not on file   Transportation Needs: Not on file   Physical Activity: Not on file   Stress: Not on file   Social Connections: Not on file   Intimate Partner Violence: Not on file   Housing Stability: Not on file     Family History  History reviewed. No pertinent family history.    OBJECTIVE  /79   Pulse 91   Temp 97.5 °F (36.4 °C)   Resp 16   Ht 1.854 m (6' 1\")   Wt 74.8 kg (165 lb)   SpO2 97%   BMI 21.77 kg/m²        Pre-Morbid mRS:

## 2024-07-08 NOTE — ED NOTES
Pt to ed via ems for slurred speech and right sided facial droop. Pts last known well was 2300 on 7/7/24. Ems states pt woke up with these symptoms. Ems states pt went to doctors appt this AM. Pt states he went to his cardiologist appt this morning. Ems states no previous history of stroke. Ems states pt has been noncompliant with BP meds until recently. Ems states hx a-fib. Pt answering all questions appropriately he is just having a hard time getting out when he is trying to say. Pt able to transfer independently from ems stretcher to ed stretcher. Pt placed on full cardiac monitor. Pt a/o x4. Bed locked and in lowest position. Family at bedside.

## 2024-07-09 ENCOUNTER — APPOINTMENT (OUTPATIENT)
Dept: CT IMAGING | Age: 54
End: 2024-07-09
Payer: COMMERCIAL

## 2024-07-09 ENCOUNTER — APPOINTMENT (OUTPATIENT)
Age: 54
End: 2024-07-09
Payer: COMMERCIAL

## 2024-07-09 PROBLEM — I63.512 CEREBROVASCULAR ACCIDENT (CVA) DUE TO OCCLUSION OF LEFT MIDDLE CEREBRAL ARTERY (HCC): Status: ACTIVE | Noted: 2024-07-09

## 2024-07-09 LAB
ANION GAP SERPL CALCULATED.3IONS-SCNC: 10 MMOL/L (ref 9–16)
ANTI-XA UNFRAC HEPARIN: 0.19 IU/L
ANTI-XA UNFRAC HEPARIN: 0.25 IU/L
ANTI-XA UNFRAC HEPARIN: 0.49 IU/L
ANTI-XA UNFRAC HEPARIN: 0.5 IU/L
BASOPHILS # BLD: 0.03 K/UL (ref 0–0.2)
BASOPHILS NFR BLD: 1 % (ref 0–2)
BUN SERPL-MCNC: 10 MG/DL (ref 6–20)
CALCIUM SERPL-MCNC: 8.7 MG/DL (ref 8.6–10.4)
CHLORIDE SERPL-SCNC: 99 MMOL/L (ref 98–107)
CHOLEST SERPL-MCNC: 145 MG/DL (ref 0–199)
CHOLESTEROL/HDL RATIO: 3
CO2 SERPL-SCNC: 24 MMOL/L (ref 20–31)
CREAT SERPL-MCNC: 1.1 MG/DL (ref 0.7–1.2)
ECHO AO ROOT DIAM: 2.9 CM
ECHO AO ROOT INDEX: 1.46 CM/M2
ECHO AV AREA PEAK VELOCITY: 2.3 CM2
ECHO AV AREA VTI: 2.3 CM2
ECHO AV AREA/BSA PEAK VELOCITY: 1.2 CM2/M2
ECHO AV AREA/BSA VTI: 1.2 CM2/M2
ECHO AV MEAN GRADIENT: 3 MMHG
ECHO AV MEAN VELOCITY: 0.8 M/S
ECHO AV PEAK GRADIENT: 7 MMHG
ECHO AV PEAK VELOCITY: 1.3 M/S
ECHO AV PEAK VELOCITY: 1.3 M/S
ECHO AV VTI: 22.3 CM
ECHO BSA: 1.96 M2
ECHO LA AREA 2C: 36.6 CM2
ECHO LA AREA 4C: 36 CM2
ECHO LA DIAMETER INDEX: 2.02 CM/M2
ECHO LA DIAMETER: 4 CM
ECHO LA MAJOR AXIS: 7.7 CM
ECHO LA MINOR AXIS: 6.8 CM
ECHO LA TO AORTIC ROOT RATIO: 1.38
ECHO LA VOL BP: 158 ML (ref 18–58)
ECHO LA VOL MOD A2C: 160 ML (ref 18–58)
ECHO LA VOL MOD A4C: 137 ML (ref 18–58)
ECHO LA VOL/BSA BIPLANE: 80 ML/M2 (ref 16–34)
ECHO LA VOLUME INDEX MOD A2C: 81 ML/M2 (ref 16–34)
ECHO LA VOLUME INDEX MOD A4C: 69 ML/M2 (ref 16–34)
ECHO LV EDV A2C: 74 ML
ECHO LV EDV A4C: 46 ML
ECHO LV EDV INDEX A4C: 23 ML/M2
ECHO LV EDV NDEX A2C: 37 ML/M2
ECHO LV EJECTION FRACTION A2C: 51 %
ECHO LV EJECTION FRACTION A4C: 24 %
ECHO LV ESV A2C: 37 ML
ECHO LV ESV A4C: 35 ML
ECHO LV ESV INDEX A2C: 19 ML/M2
ECHO LV ESV INDEX A4C: 18 ML/M2
ECHO LV FRACTIONAL SHORTENING: 35 % (ref 28–44)
ECHO LV INTERNAL DIMENSION DIASTOLE INDEX: 2.47 CM/M2
ECHO LV INTERNAL DIMENSION DIASTOLIC: 4.9 CM (ref 4.2–5.9)
ECHO LV INTERNAL DIMENSION SYSTOLIC INDEX: 1.62 CM/M2
ECHO LV INTERNAL DIMENSION SYSTOLIC: 3.2 CM
ECHO LV IVSD: 0.9 CM (ref 0.6–1)
ECHO LV MASS 2D: 153 G (ref 88–224)
ECHO LV MASS INDEX 2D: 77.2 G/M2 (ref 49–115)
ECHO LV POSTERIOR WALL DIASTOLIC: 0.9 CM (ref 0.6–1)
ECHO LV RELATIVE WALL THICKNESS RATIO: 0.37
ECHO LVOT AREA: 3.5 CM2
ECHO LVOT AV VTI INDEX: 0.65
ECHO LVOT DIAM: 2.1 CM
ECHO LVOT MEAN GRADIENT: 1 MMHG
ECHO LVOT MEAN GRADIENT: 1 MMHG
ECHO LVOT PEAK GRADIENT: 3 MMHG
ECHO LVOT PEAK VELOCITY: 0.9 M/S
ECHO LVOT STROKE VOLUME INDEX: 25.5 ML/M2
ECHO LVOT SV: 50.5 ML
ECHO LVOT VTI: 14.6 CM
ECHO MV AREA VTI: 2.4 CM2
ECHO MV E DECELERATION TIME (DT): 165 MS
ECHO MV E VELOCITY: 0.88 M/S
ECHO MV LVOT VTI INDEX: 1.47
ECHO MV MAX VELOCITY: 1.3 M/S
ECHO MV MEAN GRADIENT: 2 MMHG
ECHO MV MEAN VELOCITY: 0.6 M/S
ECHO MV PEAK GRADIENT: 6 MMHG
ECHO MV VTI: 21.4 CM
ECHO PV MAX VELOCITY: 0.9 M/S
ECHO PV PEAK GRADIENT: 3 MMHG
ECHO RA AREA 4C: 30.2 CM2
ECHO RA END SYSTOLIC VOLUME APICAL 4 CHAMBER INDEX BSA: 52 ML/M2
ECHO RA VOLUME: 102 ML
ECHO RV BASAL DIMENSION: 3.8 CM
ECHO RV FREE WALL PEAK S': 12 CM/S
EKG ATRIAL RATE: 112 BPM
EKG ATRIAL RATE: 92 BPM
EKG P-R INTERVAL: 230 MS
EKG Q-T INTERVAL: 328 MS
EKG Q-T INTERVAL: 362 MS
EKG QRS DURATION: 78 MS
EKG QRS DURATION: 82 MS
EKG QTC CALCULATION (BAZETT): 416 MS
EKG QTC CALCULATION (BAZETT): 494 MS
EKG R AXIS: -28 DEGREES
EKG R AXIS: 21 DEGREES
EKG T AXIS: 50 DEGREES
EKG T AXIS: 53 DEGREES
EKG VENTRICULAR RATE: 112 BPM
EKG VENTRICULAR RATE: 97 BPM
EOSINOPHIL # BLD: 0.1 K/UL (ref 0–0.44)
EOSINOPHILS RELATIVE PERCENT: 2 % (ref 1–4)
ERYTHROCYTE [DISTWIDTH] IN BLOOD BY AUTOMATED COUNT: 17.3 % (ref 11.8–14.4)
EST. AVERAGE GLUCOSE BLD GHB EST-MCNC: 134 MG/DL
GFR, ESTIMATED: 78 ML/MIN/1.73M2
GLUCOSE SERPL-MCNC: 130 MG/DL (ref 74–99)
HBA1C MFR BLD: 6.3 % (ref 4–6)
HCT VFR BLD AUTO: 50.5 % (ref 40.7–50.3)
HDLC SERPL-MCNC: 46 MG/DL
HGB BLD-MCNC: 15.6 G/DL (ref 13–17)
IMM GRANULOCYTES # BLD AUTO: 0.03 K/UL (ref 0–0.3)
IMM GRANULOCYTES NFR BLD: 1 %
LDLC SERPL CALC-MCNC: 81 MG/DL (ref 0–100)
LYMPHOCYTES NFR BLD: 1.4 K/UL (ref 1.1–3.7)
LYMPHOCYTES RELATIVE PERCENT: 27 % (ref 24–43)
MAGNESIUM SERPL-MCNC: 2 MG/DL (ref 1.6–2.6)
MCH RBC QN AUTO: 25.1 PG (ref 25.2–33.5)
MCHC RBC AUTO-ENTMCNC: 30.9 G/DL (ref 28.4–34.8)
MCV RBC AUTO: 81.2 FL (ref 82.6–102.9)
MONOCYTES NFR BLD: 0.31 K/UL (ref 0.1–1.2)
MONOCYTES NFR BLD: 6 % (ref 3–12)
NEUTROPHILS NFR BLD: 63 % (ref 36–65)
NEUTS SEG NFR BLD: 3.32 K/UL (ref 1.5–8.1)
NRBC BLD-RTO: 0 PER 100 WBC
PLATELET # BLD AUTO: 239 K/UL (ref 138–453)
PMV BLD AUTO: 9.5 FL (ref 8.1–13.5)
POTASSIUM SERPL-SCNC: 3.4 MMOL/L (ref 3.7–5.3)
RBC # BLD AUTO: 6.22 M/UL (ref 4.21–5.77)
RBC # BLD: ABNORMAL 10*6/UL
SODIUM SERPL-SCNC: 133 MMOL/L (ref 136–145)
TRIGL SERPL-MCNC: 92 MG/DL
VLDLC SERPL CALC-MCNC: 18 MG/DL
WBC OTHER # BLD: 5.2 K/UL (ref 3.5–11.3)

## 2024-07-09 PROCEDURE — 2580000003 HC RX 258: Performed by: NURSE PRACTITIONER

## 2024-07-09 PROCEDURE — 92507 TX SP LANG VOICE COMM INDIV: CPT

## 2024-07-09 PROCEDURE — 93306 TTE W/DOPPLER COMPLETE: CPT | Performed by: INTERNAL MEDICINE

## 2024-07-09 PROCEDURE — 83036 HEMOGLOBIN GLYCOSYLATED A1C: CPT

## 2024-07-09 PROCEDURE — 2060000000 HC ICU INTERMEDIATE R&B

## 2024-07-09 PROCEDURE — 6360000002 HC RX W HCPCS: Performed by: NURSE PRACTITIONER

## 2024-07-09 PROCEDURE — 97166 OT EVAL MOD COMPLEX 45 MIN: CPT

## 2024-07-09 PROCEDURE — 6360000004 HC RX CONTRAST MEDICATION: Performed by: STUDENT IN AN ORGANIZED HEALTH CARE EDUCATION/TRAINING PROGRAM

## 2024-07-09 PROCEDURE — 6370000000 HC RX 637 (ALT 250 FOR IP): Performed by: NURSE PRACTITIONER

## 2024-07-09 PROCEDURE — 80061 LIPID PANEL: CPT

## 2024-07-09 PROCEDURE — 0042T CT BRAIN PERFUSION: CPT

## 2024-07-09 PROCEDURE — 99223 1ST HOSP IP/OBS HIGH 75: CPT | Performed by: PSYCHIATRY & NEUROLOGY

## 2024-07-09 PROCEDURE — 97162 PT EVAL MOD COMPLEX 30 MIN: CPT

## 2024-07-09 PROCEDURE — 97530 THERAPEUTIC ACTIVITIES: CPT

## 2024-07-09 PROCEDURE — 97535 SELF CARE MNGMENT TRAINING: CPT

## 2024-07-09 PROCEDURE — 94761 N-INVAS EAR/PLS OXIMETRY MLT: CPT

## 2024-07-09 PROCEDURE — 70450 CT HEAD/BRAIN W/O DYE: CPT

## 2024-07-09 PROCEDURE — 6370000000 HC RX 637 (ALT 250 FOR IP)

## 2024-07-09 PROCEDURE — 85025 COMPLETE CBC W/AUTO DIFF WBC: CPT

## 2024-07-09 PROCEDURE — 80048 BASIC METABOLIC PNL TOTAL CA: CPT

## 2024-07-09 PROCEDURE — 85520 HEPARIN ASSAY: CPT

## 2024-07-09 PROCEDURE — 36415 COLL VENOUS BLD VENIPUNCTURE: CPT

## 2024-07-09 PROCEDURE — 93306 TTE W/DOPPLER COMPLETE: CPT

## 2024-07-09 PROCEDURE — 83735 ASSAY OF MAGNESIUM: CPT

## 2024-07-09 RX ADMIN — POTASSIUM BICARBONATE 40 MEQ: 782 TABLET, EFFERVESCENT ORAL at 09:47

## 2024-07-09 RX ADMIN — ATORVASTATIN CALCIUM 80 MG: 80 TABLET, FILM COATED ORAL at 20:47

## 2024-07-09 RX ADMIN — HEPARIN SODIUM 16 UNITS/KG/HR: 10000 INJECTION, SOLUTION INTRAVENOUS at 12:13

## 2024-07-09 RX ADMIN — ASPIRIN 81 MG 81 MG: 81 TABLET ORAL at 09:47

## 2024-07-09 RX ADMIN — SODIUM CHLORIDE, PRESERVATIVE FREE 10 ML: 5 INJECTION INTRAVENOUS at 09:48

## 2024-07-09 RX ADMIN — HEPARIN SODIUM 2000 UNITS: 1000 INJECTION INTRAVENOUS; SUBCUTANEOUS at 06:08

## 2024-07-09 RX ADMIN — IOPAMIDOL 50 ML: 755 INJECTION, SOLUTION INTRAVENOUS at 11:11

## 2024-07-09 RX ADMIN — SODIUM CHLORIDE, PRESERVATIVE FREE 10 ML: 5 INJECTION INTRAVENOUS at 20:47

## 2024-07-09 RX ADMIN — HEPARIN SODIUM 2000 UNITS: 1000 INJECTION INTRAVENOUS; SUBCUTANEOUS at 13:44

## 2024-07-09 ASSESSMENT — PAIN SCALES - GENERAL: PAINLEVEL_OUTOF10: 0

## 2024-07-09 NOTE — CARE COORDINATION
Case Management Assessment  Initial Evaluation    Date/Time of Evaluation: 7/9/2024 7:20 AM  Assessment Completed by: Giulia Sheehan RN    If patient is discharged prior to next notation, then this note serves as note for discharge by case management.    Patient Name: Alberto Torres                   YOB: 1970  Diagnosis: Acute ischemic left MCA stroke (HCC) [I63.512]  Cerebrovascular accident (CVA) due to occlusion of left middle cerebral artery (HCC) [I63.512]  Cerebrovascular accident (CVA) due to embolism of left middle cerebral artery (HCC) [I63.412]                   Date / Time: 7/8/2024  9:51 AM    Patient Admission Status: Inpatient   Readmission Risk (Low < 19, Mod (19-27), High > 27): Readmission Risk Score: 10.8    Current PCP: No primary care provider on file.  PCP verified by CM? No (no PCP list provided)    Chart Reviewed: Yes      History Provided by: Patient  Patient Orientation: Alert and Oriented    Patient Cognition: Alert    Hospitalization in the last 30 days (Readmission):  No    If yes, Readmission Assessment in CM Navigator will be completed.    Advance Directives:      Code Status: Full Code   Patient's Primary Decision Maker is: Legal Next of Kin      Discharge Planning:    Patient lives with: Spouse/Significant Other Type of Home: Apartment  Primary Care Giver: Self  Patient Support Systems include: Spouse/Significant Other, Family Members   Current Financial resources: Medicaid  Current community resources: None  Current services prior to admission: None            Current DME:              Type of Home Care services:  None    ADLS  Prior functional level: Independent in ADLs/IADLs  Current functional level: Other (see comment) (awaiting therapy)    PT AM-PAC:   /24  OT AM-PAC:   /24    Family can provide assistance at DC: Yes  Would you like Case Management to discuss the discharge plan with any other family members/significant others, and if so, who? No  Plans to Return to

## 2024-07-09 NOTE — H&P
[facility] Neurology   IN-PATIENT SERVICE   [facility]    HISTORY AND PHYSICAL EXAMINATION            Date:   7/9/2024  Patient name:  Alberto Torres  Date of admission:  7/8/2024  9:51 AM  MRN:   [id number]  Account:  [id number]  YOB: 1970  PCP:    No primary care provider on file.  Room:   [address] [place] [address]  Code Status:    Full Code    Chief Complaint:     Chief Complaint   Patient presents with    Facial Droop     Right sided   Dysarthria    History Obtained From:     patient    History of Present Illness:     The patient is a 53 y.o.  Non- / non  male who presents with Facial Droop (Right sided)    Patient went to bed last night at 11 PM and woke up this morning with symptoms at 6 AM. Patient has symptoms of some dysarthria and mild difficulty getting her speech out. He has no history of stroke, does have a history of atrial fibrillation, on Coumadin. He was not compliant with medication. Past medical history is also positive for anxiety, bipolar disorder, depression, and cocaine/smoking use. But, the patient reported that he is off cocaine for 7 years and had recently taken in past days. Patient woke up this morning and went to his cardiology office, the cardiologist noticed his speech being abnormal and sent him here for further evaluation.     He presented to [facility]. Initial NIHSS 2 for R facial droop, drooling of saliva and dysarthria. CTH showed a hyperdense left M2, CTA head and neck positive for L MCA M2 occlusion. INR 1.2. Transferred to [facility] for further assessment by endovascular neurosurgery.     Echocardiography showed EF of 50-55%, mild global hypokinesis, and severe dilation of left and right atrium.     Patient enrolled into medical arm of DISTALS trial and has been placed on aspirin and heparin.     Past Medical History:     Past Medical History:   Diagnosis Date    Anxiety     Atrial fibrillation (HCC)     Bipolar 1  Absolute 0.03 0.00 - 0.20 k/uL    Immature Granulocytes Absolute 0.03 0.00 - 0.30 k/uL    RBC Morphology ANISOCYTOSIS PRESENT MICROCYTOSIS PRESENT    Magnesium    Collection Time: 07/09/24  5:07 AM   Result Value Ref Range    Magnesium 2.0 1.6 - 2.6 mg/dL   Anti-Xa, Unfractionated Heparin    Collection Time: 07/09/24 12:42 PM   Result Value Ref Range    Anti-XA Unfrac Heparin 0.25 IU/L     Imaging:    CT Head wo contrast    1. No perfusion mismatch.  2. Stable focus of hypodensity within the posterior left frontal lobe may  represent age-indeterminate ischemia.  3. Small hyperdensity within the M2 branch of the left MCA is less prominent.    CT Brain Perfusion     1. No perfusion mismatch.  2. Stable focus of hypodensity within the posterior left frontal lobe may  represent age-indeterminate ischemia.  3. Small hyperdensity within the M2 branch of the left MCA is less prominent.    CTA    Suspected small M2 branch occlusion within the left MCA within the sylvian  fissure.  2. Unremarkable CT angiogram neck.  3. 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    MRI     Suspected subtle small acute infarcts within the left frontotemporal lobe and  left external capsule.    Assessment :      Primary Problem  Acute ischemic left MCA stroke (HCC)    Active Hospital Problems    Diagnosis Date Noted    Cerebrovascular accident (CVA) due to occlusion of left middle cerebral artery (HCC) [I63.512] 07/09/2024    Acute ischemic left MCA stroke (HCC) [I63.512] 07/08/2024    History of atrial fibrillation [Z86.79] 07/08/2024    Subtherapeutic international normalized ratio (INR) [R79.1] 07/08/2024       Patient is a 53 y.o. Non- / non  male presented with Dysarthria. Imaging showed left M2 occlusion with the area of hypoperfusion of 5 ml in the posterior left temporal lobe.     Plan:     Patient status Admit as inpatient in the  Progressive Unit/Step down    NEUROLOGIC:  Acute ischemic stroke, M2

## 2024-07-09 NOTE — PROGRESS NOTES
Physical Therapy  Facility/Department: [address] NEURO ICU  Physical Therapy Initial Assessment    Name: Alberto Torres  : 1970  MRN: [id number]  Date of Service: 2024    Chief Complaint   Patient presents with    Facial Droop     Right sided     \"The patient is a 53 y.o. left-handed male who presented with slurred speech upon waking. Patient went to bed and awoke with slurred speech. PMH of hypertension and afib on coumadin with noncompliance. Patient went to his cardiology appointment this morning. Was instructed to go to the emergency department for evaluation. He presented to [facility]. Initial NIHSS 2 for R facial droop and dysarthria. CTH showed a hyperdense left M2, CTA head and neck positive for L MCA M2 occlusion. INR 1.2. Transferred to [facility] for further assessment by endovascular neurosurgery.\"    Discharge Recommendations: Further therapy recommended at discharge.       PT Equipment Recommendations  Equipment Needed: No      Patient Diagnosis(es): The primary encounter diagnosis was Cerebrovascular accident (CVA) due to occlusion of left middle cerebral artery (HCC). A diagnosis of Cerebrovascular accident (CVA) due to embolism of left middle cerebral artery (HCC) was also pertinent to this visit.  Past Medical History:  has a past medical history of Anxiety, Atrial fibrillation (HCC), Bipolar 1 disorder (HCC), Clinical trial participant, Depression, Hypertension, and Lactose intolerance.  Past Surgical History:  has no past surgical history on file.    Assessment   Body Structures, Functions, Activity Limitations Requiring Skilled Therapeutic Intervention: Decreased functional mobility ;Decreased balance;Decreased safe awareness  Assessment: Pt ambulates 250 ft CGA without device. Pt should be safe to return to prior living situation with intermittent assistance as needed for functional mobility. Pt would benefit from continued PT prior to discharge to promote      BRAYDEN LANCASTER, SPT   This treatment/evaluation completed by signing SPT. Signing PT agrees with treatment and documentation.

## 2024-07-09 NOTE — PROGRESS NOTES
Occupational Therapy  Facility/Department: [address] NEURO ICU  Occupational Therapy Initial Assessment    Name: Alberto Torres  : 1970  MRN: [id number]  Date of Service: 2024    Chief Complaint   Patient presents with    Facial Droop     Right sided     Discharge Recommendations:   (No further OT recommended at discharge)       Patient Diagnosis(es): The primary encounter diagnosis was Cerebrovascular accident (CVA) due to occlusion of left middle cerebral artery (HCC). A diagnosis of Cerebrovascular accident (CVA) due to embolism of left middle cerebral artery (HCC) was also pertinent to this visit.  Past Medical History:  has a past medical history of Anxiety, Atrial fibrillation (HCC), Bipolar 1 disorder (HCC), Clinical trial participant, Depression, Hypertension, and Lactose intolerance.  Past Surgical History:  has no past surgical history on file.       Assessment   Performance deficits / Impairments: Decreased functional mobility ;Decreased ADL status;Decreased safe awareness;Decreased cognition;Decreased endurance;Decreased balance;Decreased high-level IADLs  Assessment: Pt presents with above noted deficits impacting pt's ADL status. Pt to benefit from continued therapy services while hospitalized and at discharge to maximize pt's safety and independence in performing functional tasks. Pt expected to be safe to return to prior living arrangements at discharge with supportive family able to assist as needed as pt progresses towards below stated goals with therapy.  Prognosis: Good  Decision Making: Medium Complexity  REQUIRES OT FOLLOW-UP: Yes  Activity Tolerance  Activity Tolerance: Patient Tolerated treatment well      Safety Devices  Type of Devices: Left in chair;Call light within reach;Nurse notified;Gait belt  Restraints  Restraints Initially in Place: No    Plan   Occupational Therapy Plan  Times Per Week: 2-3x/wk  Current Treatment Recommendations: Balance training, Functional mobility  training, Endurance training, Safety education & training, Patient/Caregiver education & training, Self-Care / ADL, Equipment evaluation, education, & procurement     Restrictions  Restrictions/Precautions  Restrictions/Precautions: Up as Tolerated  Required Braces or Orthoses?: No  Position Activity Restriction  Other position/activity restrictions: -180 mmHg,  L MCA M2 occlusion    Subjective   General  Patient assessed for rehabilitation services?: Yes  Family / Caregiver Present: No  General Comment  Comments: RN ok'd for therapy this PM. Pt agreeable to participate in session and pleasant/cooperative throughout. Pt denies pain/numbness/tingling.       Social/Functional History  Social/Functional History  Lives With: Friend(s)  Type of Home: Apartment  Home Layout: One level  Home Access: Level entry  Bathroom Shower/Tub: Tub/Shower unit  Bathroom Toilet: Standard  Bathroom Equipment: Grab bars around toilet, Grab bars in shower  Bathroom Accessibility: Accessible  Home Equipment: None  Has the patient had two or more falls in the past year or any fall with injury in the past year?: No  ADL Assistance: Independent  Homemaking Assistance: Independent  Homemaking Responsibilities: Yes (all IADL tasks able to be shared with friend)  Ambulation Assistance: Independent  Transfer Assistance: Independent  Active : No  Occupation: Unemployed  Additional Comments: L handed.       Objective   Balance  Sitting:  (SUP- static, SBA- dynamic)  Standing:  (SBA- static, CGA- dynamic; static/dynamic standing at sink side during grooming tasks, ~3-4 minutes, no complaint of fatigue however does report dizziness throughout)    Toilet Transfers  Toilet - Technique: Ambulating  Equipment Used: Standard toilet  Toilet Transfer: Stand by assistance    AROM: Within functional limits (BUE)  Strength: Within functional limits (BUE grossly 5/5)  Coordination: Within functional limits (BUE FMC/GMC)    ADL  Grooming: Stand by

## 2024-07-09 NOTE — PROGRESS NOTES
Speech Language Pathology  [facility]    Speech/Language Treatment Note    Date: 7/9/2024  Patient’s Name: Alberto Torres  MRN: [id number]    Patient Active Problem List   Diagnosis Code    Acute ischemic left MCA stroke (HCC) I63.512    History of atrial fibrillation Z86.79    Subtherapeutic international normalized ratio (INR) R79.1    Cerebrovascular accident (CVA) due to occlusion of left middle cerebral artery (HCC) I63.512       Pain: 0/10    Dysphagia Treatment  Treatment time: 9666-8061    Subjective: [x] Alert [x] Cooperative     [] Confused     [] Agitated    [] Lethargic    Objective/Assessment:    Pt. Seen for O/M treatment program for dysarthria.  Pt. Completed O/M exercises X 1 X 5-10 sets with min visual cues.  Education provided re: compensatory strategies to increase speech intelligibility/clarity.  Pt. Verbalized understanding.  Exercise program left at bedside.        Plan:  [x] Continue ST services    [] Discharge from ST:        Discharge recommendations: []  Further therapy recommended at discharge.The patient should be able to tolerate at least 3 hours of therapy per day over 5 days or 15 hours over 7 days. [x] Further therapy recommended at discharge.   [] No therapy recommended at discharge.         Treatment Completed by Rita Joe  Clinician    Co-signed by Alicia Iniguez M.A.CCC/SLP

## 2024-07-09 NOTE — TRANSFER CENTER NOTE
Neuro Critical Care Sign Out to Neurology Resident      Date and time: 7/9/2024 11:58 AM  Patient's name:  Alberto Torres  Medical Record Number: [id number]  Patient's account/billing number: [id number]  Patient's YOB: 1970  Age: 53 y.o.  Date of Admission: 7/8/2024  9:51 AM  Length of stay during current admission: 1    Primary Care Physician: No primary care provider on file.    Code Status: Full Code    Mode of physician to physician communication:        [x] Via telephone   [] In person     Date and time of sign-out: 7/9/2024 11:58 AM    Accepting Neurology Resident: Dr. Osullivan     Accepting team's attending: Dr. Savage     Patient's current ICU Bed:  117     Patient's assigned bed on floor:  Neurology     Reason for ICU admission:     Acute ischemic stroke     ICU course summary:     No acute events. Was put on IF NS 75ml/hr, heparin drip, aspirin 81 mg daily, atorvastatin 80mg PO QD. BP within goal 130-180 mmHg, no fevers. Has been hemodynamically and neurologically stable. Notes some subjective improvement in his speech.     Procedures during patient's ICU stay:     None    Current Vitals:     BP (!) 151/89   Pulse 95   Temp 98.8 °F (37.1 °C) (Oral)   Resp (!) 32   SpO2 97%       Cultures:     Blood cultures:                 [x] None drawn      [] Negative             []  Positive (Details:  )  Urine Culture:                   [x] None drawn      [] Negative             []  Positive (Details:  )  Sputum Culture:               [x] None drawn       [] Negative             []  Positive (Details:  )   Endotracheal aspirate:     [x] None drawn       [] Negative             []  Positive (Details:  )       Consults:     Endovascular:  Patient enrolled in DISTALS trial, control arm  Initiated heparin  Loaded with aspirin 324, continue aspirin 81 mg daily  Stat MRI brain limited  Stat cardiac echo to evaluate for mural thrombus  Admit to neuro ICU  -180    Assessment:     Patient Active  control arm    Above mentioned assessment and plan was discussed by me with the admitting medicine resident. The medicine team assigned to the patient by medicine admitting resident will be following up the patient from now onwards on the floor.     Jose Marrufo MD  Neuro Critical Care  7/9/2024, 11:58 AM

## 2024-07-09 NOTE — PROGRESS NOTES
Daily Progress Note  Neuro Critical Care    Patient Name: Alberto Torres  Patient : 1970  Room/Bed: 0117/0117-01  Code Status: FULL  Allergies: No Known Allergies    CHIEF COMPLAINT:      Slurred speech, face asymmetry      INTERVAL HISTORY    Initial Presentation (Admitted ):  The patient is a 53 y.o. left-handed male who presented with slurred speech upon waking this morning. Patient went to bed around 11 pm last night and awoke today at 6 am with slurred speech. PMH of hypertension and afib on coumadin with noncompliance. Patient went to his cardiology appointment this morning. Was instructed to go to the emergency department for evaluation. He presented to [facility]. Initial NIHSS 2 for R facial droop and dysarthria. CTH showed a hyperdense left M2, CTA head and neck positive for L MCA M2 occlusion. INR 1.2. Transferred to [facility] for further assessment by endovascular neurosurgery.     Interval Events:   No acute events overnight. Notes some subjective improvement in his speech.    CURRENT MEDICATIONS:  SCHEDULED MEDICATIONS:   aspirin  81 mg Oral Daily    sodium chloride flush  5-40 mL IntraVENous 2 times per day    atorvastatin  80 mg Oral Nightly     CONTINUOUS INFUSIONS:   heparin (PORCINE) Infusion 14 Units/kg/hr (24)    sodium chloride      sodium chloride       PRN MEDICATIONS:   heparin (porcine), heparin (porcine), sodium chloride flush, sodium chloride, ondansetron **OR** ondansetron, polyethylene glycol    VITALS:  Temperature Range: Temp: 98.5 °F (36.9 °C) Temp  Av.3 °F (36.8 °C)  Min: 97.5 °F (36.4 °C)  Max: 98.5 °F (36.9 °C)  BP Range: Systolic (24hrs), Av , Min:112 , Max:148     Diastolic (24hrs), Av, Min:67, Max:110    Pulse Range: Pulse  Av.3  Min: 90  Max: 142  Respiration Range: Resp  Av.1  Min: 12  Max: 52  Current Pulse Ox: SpO2: 97 %  24HR Pulse Ox Range: SpO2  Av.3 %  Min: 90 %  Max: 100 %  Patient Vitals  stable.  - Heparin drip, aspirin 81 mg daily   - Goal -180  - Neuro checks per protocol  - Repeat CT, CT perfusion today then touch base with endovascular    CARDIOVASCULAR:  Atrial fibrillation  Tachycardia  - Single BP spike 189/111 this AM  - Permissive hypertension  - Atorvastatin 80mg PO QD  - Heparin infusion  - Echocardiography today  - Goal -180  - Continue telemetry    PULMONARY:  Smoker  History of abnormal chest X-ray  - Encourage cessation  - Continue telemetry    RENAL/FLUID/ELECTROLYTE:  - Na 133/ K 3.4  - BUN 10/ Creatinine 1.1  - IVF: 75mL/hr  - Replace electrolytes PRN  - Daily BMP    GI/NUTRITION:  NUTRITION:  ADULT DIET; Regular  - Bowel regimen: Glycolax    ID:  - Tmax 98.5  - WBC 5.2  - Continue to monitor for fevers  - Daily CBC    HEME:   - H&H 15.6/50.5  - Platelets 239  - Daily CBC    ENDOCRINE:  - Continue to monitor blood glucose, goal <180    OTHER:  History of cocaine, cannabis use  Positive urine toxicology for cocaine, cannabis  - Encourage cessation  - PT/OT/ST     DVT PROPHYLAXIS:  - SCD sleeves - Thigh High   - Heparin       DISPOSITION:  [] To remain ICU: **  [x] OK for out of ICU from Neuro Critical Care standpoint    Jose Marrufo MD  Neuro Critical Care  7/9/2024     5:57 AM

## 2024-07-10 LAB
ANION GAP SERPL CALCULATED.3IONS-SCNC: 11 MMOL/L (ref 9–16)
ANTI-XA UNFRAC HEPARIN: 0.15 IU/L
ANTI-XA UNFRAC HEPARIN: 0.38 IU/L
ANTI-XA UNFRAC HEPARIN: 0.46 IU/L
BASOPHILS # BLD: <0.03 K/UL (ref 0–0.2)
BASOPHILS NFR BLD: 0 % (ref 0–2)
BUN SERPL-MCNC: 8 MG/DL (ref 6–20)
CALCIUM SERPL-MCNC: 8.9 MG/DL (ref 8.6–10.4)
CHLORIDE SERPL-SCNC: 102 MMOL/L (ref 98–107)
CO2 SERPL-SCNC: 24 MMOL/L (ref 20–31)
CREAT SERPL-MCNC: 1.1 MG/DL (ref 0.7–1.2)
EOSINOPHIL # BLD: 0.1 K/UL (ref 0–0.44)
EOSINOPHILS RELATIVE PERCENT: 2 % (ref 1–4)
ERYTHROCYTE [DISTWIDTH] IN BLOOD BY AUTOMATED COUNT: 17.3 % (ref 11.8–14.4)
GFR, ESTIMATED: 80 ML/MIN/1.73M2
GLUCOSE SERPL-MCNC: 113 MG/DL (ref 74–99)
HCT VFR BLD AUTO: 50.2 % (ref 40.7–50.3)
HGB BLD-MCNC: 15.8 G/DL (ref 13–17)
IMM GRANULOCYTES # BLD AUTO: <0.03 K/UL (ref 0–0.3)
IMM GRANULOCYTES NFR BLD: 0 %
LYMPHOCYTES NFR BLD: 1.3 K/UL (ref 1.1–3.7)
LYMPHOCYTES RELATIVE PERCENT: 27 % (ref 24–43)
MAGNESIUM SERPL-MCNC: 2.1 MG/DL (ref 1.6–2.6)
MCH RBC QN AUTO: 25.6 PG (ref 25.2–33.5)
MCHC RBC AUTO-ENTMCNC: 31.5 G/DL (ref 28.4–34.8)
MCV RBC AUTO: 81.5 FL (ref 82.6–102.9)
MONOCYTES NFR BLD: 0.36 K/UL (ref 0.1–1.2)
MONOCYTES NFR BLD: 8 % (ref 3–12)
NEUTROPHILS NFR BLD: 63 % (ref 36–65)
NEUTS SEG NFR BLD: 2.95 K/UL (ref 1.5–8.1)
NRBC BLD-RTO: 0 PER 100 WBC
PLATELET # BLD AUTO: 227 K/UL (ref 138–453)
PMV BLD AUTO: 9.5 FL (ref 8.1–13.5)
POTASSIUM SERPL-SCNC: 3.5 MMOL/L (ref 3.7–5.3)
RBC # BLD AUTO: 6.16 M/UL (ref 4.21–5.77)
RBC # BLD: ABNORMAL 10*6/UL
SODIUM SERPL-SCNC: 137 MMOL/L (ref 136–145)
WBC OTHER # BLD: 4.7 K/UL (ref 3.5–11.3)

## 2024-07-10 PROCEDURE — 36415 COLL VENOUS BLD VENIPUNCTURE: CPT

## 2024-07-10 PROCEDURE — 6370000000 HC RX 637 (ALT 250 FOR IP): Performed by: STUDENT IN AN ORGANIZED HEALTH CARE EDUCATION/TRAINING PROGRAM

## 2024-07-10 PROCEDURE — 99223 1ST HOSP IP/OBS HIGH 75: CPT | Performed by: INTERNAL MEDICINE

## 2024-07-10 PROCEDURE — 85520 HEPARIN ASSAY: CPT

## 2024-07-10 PROCEDURE — 99232 SBSQ HOSP IP/OBS MODERATE 35: CPT | Performed by: PSYCHIATRY & NEUROLOGY

## 2024-07-10 PROCEDURE — 85025 COMPLETE CBC W/AUTO DIFF WBC: CPT

## 2024-07-10 PROCEDURE — 6370000000 HC RX 637 (ALT 250 FOR IP): Performed by: NURSE PRACTITIONER

## 2024-07-10 PROCEDURE — 2580000003 HC RX 258: Performed by: NURSE PRACTITIONER

## 2024-07-10 PROCEDURE — 83735 ASSAY OF MAGNESIUM: CPT

## 2024-07-10 PROCEDURE — 6370000000 HC RX 637 (ALT 250 FOR IP)

## 2024-07-10 PROCEDURE — 80048 BASIC METABOLIC PNL TOTAL CA: CPT

## 2024-07-10 PROCEDURE — 2060000000 HC ICU INTERMEDIATE R&B

## 2024-07-10 PROCEDURE — 6370000000 HC RX 637 (ALT 250 FOR IP): Performed by: PSYCHIATRY & NEUROLOGY

## 2024-07-10 PROCEDURE — 6360000002 HC RX W HCPCS: Performed by: NURSE PRACTITIONER

## 2024-07-10 RX ORDER — POTASSIUM CHLORIDE 20 MEQ/1
40 TABLET, EXTENDED RELEASE ORAL ONCE
Status: COMPLETED | OUTPATIENT
Start: 2024-07-10 | End: 2024-07-10

## 2024-07-10 RX ORDER — AMIODARONE HYDROCHLORIDE 200 MG/1
200 TABLET ORAL 2 TIMES DAILY
Status: DISCONTINUED | OUTPATIENT
Start: 2024-07-10 | End: 2024-07-11

## 2024-07-10 RX ADMIN — AMIODARONE HYDROCHLORIDE 200 MG: 200 TABLET ORAL at 11:09

## 2024-07-10 RX ADMIN — AMIODARONE HYDROCHLORIDE 200 MG: 200 TABLET ORAL at 20:59

## 2024-07-10 RX ADMIN — APIXABAN 5 MG: 5 TABLET, FILM COATED ORAL at 20:59

## 2024-07-10 RX ADMIN — HEPARIN SODIUM 18 UNITS/KG/HR: 10000 INJECTION, SOLUTION INTRAVENOUS at 04:24

## 2024-07-10 RX ADMIN — ATORVASTATIN CALCIUM 80 MG: 80 TABLET, FILM COATED ORAL at 20:59

## 2024-07-10 RX ADMIN — ASPIRIN 81 MG 81 MG: 81 TABLET ORAL at 08:33

## 2024-07-10 RX ADMIN — SODIUM CHLORIDE, PRESERVATIVE FREE 10 ML: 5 INJECTION INTRAVENOUS at 08:33

## 2024-07-10 RX ADMIN — POTASSIUM CHLORIDE 40 MEQ: 1500 TABLET, EXTENDED RELEASE ORAL at 11:10

## 2024-07-10 RX ADMIN — SODIUM CHLORIDE, PRESERVATIVE FREE 10 ML: 5 INJECTION INTRAVENOUS at 20:59

## 2024-07-10 ASSESSMENT — ENCOUNTER SYMPTOMS
RESPIRATORY NEGATIVE: 1
GASTROINTESTINAL NEGATIVE: 1
EYES NEGATIVE: 1
ALLERGIC/IMMUNOLOGIC NEGATIVE: 1

## 2024-07-10 NOTE — PLAN OF CARE
Problem: Discharge Planning  Goal: Discharge to home or other facility with appropriate resources  7/10/2024 1717 by Brittaney Carrion  Outcome: Progressing  7/10/2024 0503 by Camille Vidales RN  Outcome: Progressing     Problem: Pain  Goal: Verbalizes/displays adequate comfort level or baseline comfort level  7/10/2024 1717 by Brittaney Carrion  Outcome: Progressing  7/10/2024 0503 by Camille Vidales RN  Outcome: Progressing  Flowsheets (Taken 7/9/2024 1817 by Deb Carter, RN)  Verbalizes/displays adequate comfort level or baseline comfort level: Encourage patient to monitor pain and request assistance     Problem: Chronic Conditions and Co-morbidities  Goal: Patient's chronic conditions and co-morbidity symptoms are monitored and maintained or improved  7/10/2024 1717 by Brittaney Carrion  Outcome: Progressing  7/10/2024 0503 by Camille Vidales RN  Outcome: Progressing     Problem: Safety - Adult  Goal: Free from fall injury  7/10/2024 1717 by Brittaney Carrion  Outcome: Progressing  7/10/2024 0503 by Camille Vidales RN  Outcome: Progressing     Problem: ABCDS Injury Assessment  Goal: Absence of physical injury  7/10/2024 1717 by Brittaney Carrion  Outcome: Progressing  7/10/2024 0503 by Camille Vidales RN  Outcome: Progressing

## 2024-07-10 NOTE — CARE COORDINATION
Met with pt to assess for support and complete pHQ-9 screen. Pt stated he lives with a friend and she is supportive. Stated he has several family members in [place] and are supportive \"best they can\". Pt shared he was recently released from correction after 8yrs and is adjusting to his new life.  Pt shared he has different appts to get to and transportation an issue. Advised pt that he has medical cab thru his insurance and he was unaware. Provided him the phone # . Pt reports he has a \"little bit\" of depression and shared his son  last year of heart attack at age 34. Pt denies any SI. Pt stated he is linked with [facility] and has an appt on .   Patient Health Questionnaire-9 (PHQ-9)    Over the last 2 weeks, how often have you been bothered by any of the following problems?    1. Little Interest or pleasure in doing things?   [x] Not at all  [] Several Days  [] More than half the day  []  Nearly every day    2. Feeling down, depressed or hopeless?    [] Not at all  [x] Several Days  [] More than half the day  []  Nearly every day    3. Trouble falling or staying asleep, or sleeping too much?   [x] Not at all  [] Several Days  [] More than half the day  []  Nearly every day    4. Feeling tired or having little energy?   [x] Not at all  [] Several Days  [] More than half the day  []  Nearly every day    5. Poor apettite or overeating?   [x] Not at all  [] Several Days  [] More than half the day  []  Nearly every day    6. Feeling bad about yourself-or that you are a failure or have let yourself or your family down?   [x] Not at all  [] Several Days  [] More than half the day  []  Nearly every day    7. Trouble concentrating on things, such as reading the newspaper or watching television?   [x] Not at all  [] Several Days  [] More than half the day  []  Nearly every day    8. Moving or speaking so slowly that other people could have noticed? Or the opposite-being so fidgety or restless that you have been moving around

## 2024-07-10 NOTE — PLAN OF CARE
Problem: Discharge Planning  Goal: Discharge to home or other facility with appropriate resources  7/10/2024 0503 by Camille Vidales, RN  Outcome: Progressing  7/9/2024 1724 by Cecy Finn, RN  Outcome: Progressing     Problem: Pain  Goal: Verbalizes/displays adequate comfort level or baseline comfort level  Outcome: Progressing  Flowsheets (Taken 7/9/2024 1817 by Deb Carter, RN)  Verbalizes/displays adequate comfort level or baseline comfort level: Encourage patient to monitor pain and request assistance     Problem: Chronic Conditions and Co-morbidities  Goal: Patient's chronic conditions and co-morbidity symptoms are monitored and maintained or improved  7/10/2024 0503 by Camille Vidales, RN  Outcome: Progressing  7/9/2024 1724 by Cecy Finn, RN  Outcome: Progressing     Problem: Safety - Adult  Goal: Free from fall injury  Outcome: Progressing     Problem: ABCDS Injury Assessment  Goal: Absence of physical injury  Outcome: Progressing

## 2024-07-10 NOTE — PROGRESS NOTES
[facility] Neurology   IN-PATIENT SERVICE   [facility]    Progress Note             Date:   7/10/2024  Patient name:  Alberto Torres  Date of admission:  7/8/2024  9:51 AM  MRN:   [id number]  Account:  [id number]  YOB: 1970  PCP:    No primary care provider on file.  Room:   [address] [place] [address]  Code Status:    Full Code    Chief Complaint:     Chief Complaint   Patient presents with    Facial Droop     Right sided       Interval hx:     The patient was seen and examined at bedside. Is vitally stable, alert and oriented x 4. No acute events overnight.  Right facial drop much  Improved, dysarthria improved, patient denies any symptoms except for very mild dysarthria.  Awaiting cardiologist recommendation, patient on heparin drip  Potassium replaced    Brief History of Present Illness:     The patient is a 53 y.o.  Non- / non  male who presents with Facial Droop (Right sided)     Patient went to bed  at 11 PM 7/7 and woke up 7/8 with symptoms at 6 AM. Patient has symptoms of some dysarthria and mild difficulty getting her speech out. He has no history of stroke, does have a history of atrial fibrillation, on Coumadin. He was not compliant with medication. Past medical history is also positive for anxiety, bipolar disorder, depression, and cocaine/smoking use. But, the patient reported that he is off cocaine for 7 years and had recently taken in past days. Patient woke up this morning and went to his cardiology office, the cardiologist noticed his speech being abnormal and sent him here for further evaluation.      He presented to [facility]. Initial NIHSS 2 for R facial droop, drooling of saliva and dysarthria. CTH showed a hyperdense left M2, CTA head and neck positive for L MCA M2 occlusion. INR 1.2. Transferred to [facility] for further assessment by endovascular neurosurgery.      Echocardiography showed EF of 50-55%, mild global hypokinesis, and

## 2024-07-10 NOTE — RESEARCH
Study: DISTALS - Distal Ischemic Stroke Treatment with Adjustable Low-profile Stentriever  Protocol Number [id number]  NCT Number: [id number]  Site PI: Dr. Daniel Alvarado     Patient Name: Alberto Torres  MRN: [id number]  YOB: 1970  Date of evaluation: 08 JUL 2024  Time of evaluation: 14:00  Reason for evaluation: follow up research assessment      [facility] STROKE SCALE (NIHSS) FORM       NIH Stroke Scale Scale Definition Score   1. A. Level of Consciousness: 0 = Alert; keenly responsive.  1 = Not alert; but arousable by minor stimulation to obey, answer, or respond.  2 = Not alert; requires repeated stimulation to attend, or is obtunded and requires strong or painful stimulation to make movements (not stereotyped).  3 = Responds only with reflex motor or autonomic effects or totally unresponsive, flaccid, and areflexic. 0   1. B. LOC Questions:  0 = Answers both questions correctly.  1 = Answers one question correctly.  2 = Answers neither question correctly. 0   1. C. LOC Commands: 0 = Performs both tasks correctly.  1 = Performs one task correctly.  2 = Performs neither task correctly. 0   2. Best gaze: 0 = Normal.  1 = Partial gaze palsy; gaze is abnormal in one or both eyes, but forced deviation or total gaze paresis is not present.  2 = Forced deviation, or total gaze paresis not overcome by the oculocephalic maneuver. 0   3. Visual: 0 = No visual loss.  1 = Partial hemianopia.  2 = Complete hemianopia.  3 = Bilateral hemianopia (blind including  Cortical blindness). 0   4. Facial palsy 0 = Normal symmetrical movements.  1 = Minor paralysis (flattened nasolabial fold, asymmetry on smiling).  2 = Partial paralysis (total or near-total paralysis of lower face).  3 = Complete paralysis on one or both sides (absence of facial movement in the upper and lower face). 1     5. A. Motor, left arm: 0 = No drift; limb holds 90 (or 45) degrees for full 10 seconds.  1 = Drift; limb holds 90 (or

## 2024-07-10 NOTE — RESEARCH
Study: DISTALS - Distal Ischemic Stroke Treatment with Adjustable Low-profile Stentriever  Protocol Number [id number]  NCT Number: [id number]  Site PI: Dr. Daniel Alvarado     Patient Name: Alberto Torres  MRN: [id number]  YOB: 1970  Date of evaluation: 09 JUL 2024  Time of evaluation:  4:00 pm  Reason for evaluation: 24 hour research follow up assessment      [facility] STROKE SCALE (NIHSS) FORM       NIH Stroke Scale Scale Definition Score   1. A. Level of Consciousness: 0 = Alert; keenly responsive.  1 = Not alert; but arousable by minor stimulation to obey, answer, or respond.  2 = Not alert; requires repeated stimulation to attend, or is obtunded and requires strong or painful stimulation to make movements (not stereotyped).  3 = Responds only with reflex motor or autonomic effects or totally unresponsive, flaccid, and areflexic. 0   1. B. LOC Questions:  0 = Answers both questions correctly.  1 = Answers one question correctly.  2 = Answers neither question correctly. 0   1. C. LOC Commands: 0 = Performs both tasks correctly.  1 = Performs one task correctly.  2 = Performs neither task correctly. 0   2. Best gaze: 0 = Normal.  1 = Partial gaze palsy; gaze is abnormal in one or both eyes, but forced deviation or total gaze paresis is not present.  2 = Forced deviation, or total gaze paresis not overcome by the oculocephalic maneuver. 0   3. Visual: 0 = No visual loss.  1 = Partial hemianopia.  2 = Complete hemianopia.  3 = Bilateral hemianopia (blind including  Cortical blindness). 0   4. Facial palsy 0 = Normal symmetrical movements.  1 = Minor paralysis (flattened nasolabial fold, asymmetry on smiling).  2 = Partial paralysis (total or near-total paralysis of lower face).  3 = Complete paralysis on one or both sides (absence of facial movement in the upper and lower face). 1     5. A. Motor, left arm: 0 = No drift; limb holds 90 (or 45) degrees for full 10 seconds.  1 = Drift; limb  holds 90 (or 45) degrees, but drifts down before full 10 seconds; does not hit bed or other support.  2 = Some effort against gravity; limb cannot get to or maintain (if cued) 90 (or 45) degrees, drifts down to bed, but has some effort against gravity.  3 = No effort against gravity; limb falls.  4 = No movement.  UN = Amputation or joint fusion, explain: __________ 0   5. B. Motor, right arm: 0 = No drift; limb holds 90 (or 45) degrees for full 10 seconds.  1 = Drift; limb holds 90 (or 45) degrees, but drifts down before full 10 seconds; does not hit bed or other support.  2 = Some effort against gravity; limb cannot get to or maintain (if cued) 90 (or 45) degrees, drifts down to bed, but has some effort against gravity.  3 = No effort against gravity; limb falls.  4 = No movement.  UN = Amputation or joint fusion, explain: ________ 0   6. A. Motor, left le = No drift; leg holds 30-degree position for full 5 seconds.  1 = Drift; leg falls by the end of the 5-second period but does not hit bed.  2 = Some effort against gravity; leg falls to bed by 5 seconds, but has some effort against gravity.  3 = No effort against gravity; leg falls to bed immediately.  4 = No movement.  UN = Amputation or joint fusion, explain: ________ 0   6. B. Motor, right le = No drift; leg holds 30-degree position for full 5 seconds.  1 = Drift; leg falls by the end of the 5-second period but does not hit bed.  2 = Some effort against gravity; leg falls to bed by 5 seconds, but has some effort against gravity.  3 = No effort against gravity; leg falls to bed immediately.  4 = No movement.  UN = Amputation or joint fusion, explain: ________ 0   7. Limb ataxia: 0 = Absent.  1 = Present in one limb.  2 = Present in two limbs.  UN = Amputation or joint fusion, explain: _________ 0   8. Sensory: 0 = Normal; no sensory loss.  1 = Mild-to-moderate sensory loss; patient feels pinprick is less sharp or is dull on the affected side; or there

## 2024-07-11 VITALS
RESPIRATION RATE: 19 BRPM | WEIGHT: 165 LBS | HEIGHT: 73 IN | TEMPERATURE: 97.5 F | DIASTOLIC BLOOD PRESSURE: 84 MMHG | HEART RATE: 93 BPM | BODY MASS INDEX: 21.87 KG/M2 | OXYGEN SATURATION: 98 % | SYSTOLIC BLOOD PRESSURE: 126 MMHG

## 2024-07-11 PROBLEM — I63.412 CEREBROVASCULAR ACCIDENT (CVA) DUE TO EMBOLISM OF LEFT MIDDLE CEREBRAL ARTERY (HCC): Status: ACTIVE | Noted: 2024-07-11

## 2024-07-11 PROBLEM — I48.91 ATRIAL FIBRILLATION WITH RAPID VENTRICULAR RESPONSE (HCC): Status: ACTIVE | Noted: 2024-07-11

## 2024-07-11 LAB
ANION GAP SERPL CALCULATED.3IONS-SCNC: 10 MMOL/L (ref 9–16)
ANTI-XA UNFRAC HEPARIN: 0.79 IU/L
ANTI-XA UNFRAC HEPARIN: 1.3 IU/L
BASOPHILS # BLD: <0.03 K/UL (ref 0–0.2)
BASOPHILS NFR BLD: 0 % (ref 0–2)
BUN SERPL-MCNC: 7 MG/DL (ref 6–20)
CALCIUM SERPL-MCNC: 8.8 MG/DL (ref 8.6–10.4)
CHLORIDE SERPL-SCNC: 105 MMOL/L (ref 98–107)
CO2 SERPL-SCNC: 23 MMOL/L (ref 20–31)
CREAT SERPL-MCNC: 1.2 MG/DL (ref 0.7–1.2)
EOSINOPHIL # BLD: 0.1 K/UL (ref 0–0.44)
EOSINOPHILS RELATIVE PERCENT: 2 % (ref 1–4)
ERYTHROCYTE [DISTWIDTH] IN BLOOD BY AUTOMATED COUNT: 17.7 % (ref 11.8–14.4)
GFR, ESTIMATED: 74 ML/MIN/1.73M2
GLUCOSE SERPL-MCNC: 106 MG/DL (ref 74–99)
HCT VFR BLD AUTO: 50 % (ref 40.7–50.3)
HGB BLD-MCNC: 15.3 G/DL (ref 13–17)
IMM GRANULOCYTES # BLD AUTO: <0.03 K/UL (ref 0–0.3)
IMM GRANULOCYTES NFR BLD: 0 %
LYMPHOCYTES NFR BLD: 1.17 K/UL (ref 1.1–3.7)
LYMPHOCYTES RELATIVE PERCENT: 25 % (ref 24–43)
MCH RBC QN AUTO: 24.9 PG (ref 25.2–33.5)
MCHC RBC AUTO-ENTMCNC: 30.6 G/DL (ref 28.4–34.8)
MCV RBC AUTO: 81.4 FL (ref 82.6–102.9)
MONOCYTES NFR BLD: 0.44 K/UL (ref 0.1–1.2)
MONOCYTES NFR BLD: 9 % (ref 3–12)
NEUTROPHILS NFR BLD: 64 % (ref 36–65)
NEUTS SEG NFR BLD: 2.99 K/UL (ref 1.5–8.1)
NRBC BLD-RTO: 0 PER 100 WBC
PLATELET # BLD AUTO: 224 K/UL (ref 138–453)
PMV BLD AUTO: 9.7 FL (ref 8.1–13.5)
POTASSIUM SERPL-SCNC: 3.9 MMOL/L (ref 3.7–5.3)
RBC # BLD AUTO: 6.14 M/UL (ref 4.21–5.77)
RBC # BLD: ABNORMAL 10*6/UL
SODIUM SERPL-SCNC: 138 MMOL/L (ref 136–145)
WBC OTHER # BLD: 4.7 K/UL (ref 3.5–11.3)

## 2024-07-11 PROCEDURE — 6370000000 HC RX 637 (ALT 250 FOR IP): Performed by: NURSE PRACTITIONER

## 2024-07-11 PROCEDURE — 97530 THERAPEUTIC ACTIVITIES: CPT

## 2024-07-11 PROCEDURE — 92507 TX SP LANG VOICE COMM INDIV: CPT

## 2024-07-11 PROCEDURE — 6370000000 HC RX 637 (ALT 250 FOR IP): Performed by: PSYCHIATRY & NEUROLOGY

## 2024-07-11 PROCEDURE — 85025 COMPLETE CBC W/AUTO DIFF WBC: CPT

## 2024-07-11 PROCEDURE — 99239 HOSP IP/OBS DSCHRG MGMT >30: CPT | Performed by: PSYCHIATRY & NEUROLOGY

## 2024-07-11 PROCEDURE — 80048 BASIC METABOLIC PNL TOTAL CA: CPT

## 2024-07-11 PROCEDURE — 36415 COLL VENOUS BLD VENIPUNCTURE: CPT

## 2024-07-11 PROCEDURE — 99233 SBSQ HOSP IP/OBS HIGH 50: CPT

## 2024-07-11 PROCEDURE — 6370000000 HC RX 637 (ALT 250 FOR IP): Performed by: STUDENT IN AN ORGANIZED HEALTH CARE EDUCATION/TRAINING PROGRAM

## 2024-07-11 PROCEDURE — 85520 HEPARIN ASSAY: CPT

## 2024-07-11 PROCEDURE — 2580000003 HC RX 258: Performed by: NURSE PRACTITIONER

## 2024-07-11 RX ORDER — AMIODARONE HYDROCHLORIDE 200 MG/1
200 TABLET ORAL DAILY
Status: DISCONTINUED | OUTPATIENT
Start: 2024-07-17 | End: 2024-07-11 | Stop reason: HOSPADM

## 2024-07-11 RX ORDER — AMIODARONE HYDROCHLORIDE 200 MG/1
200 TABLET ORAL DAILY
Qty: 30 TABLET | Refills: 1 | Status: SHIPPED | OUTPATIENT
Start: 2024-07-17 | End: 2024-09-15

## 2024-07-11 RX ORDER — AMIODARONE HYDROCHLORIDE 200 MG/1
200 TABLET ORAL 2 TIMES DAILY
Qty: 10 TABLET | Refills: 0 | Status: SHIPPED | OUTPATIENT
Start: 2024-07-11 | End: 2024-07-16

## 2024-07-11 RX ORDER — ASPIRIN 81 MG/1
81 TABLET, CHEWABLE ORAL DAILY
Qty: 30 TABLET | Refills: 3 | Status: SHIPPED | OUTPATIENT
Start: 2024-07-12

## 2024-07-11 RX ORDER — AMIODARONE HYDROCHLORIDE 200 MG/1
200 TABLET ORAL 2 TIMES DAILY
Status: DISCONTINUED | OUTPATIENT
Start: 2024-07-11 | End: 2024-07-11 | Stop reason: HOSPADM

## 2024-07-11 RX ORDER — ATORVASTATIN CALCIUM 80 MG/1
80 TABLET, FILM COATED ORAL NIGHTLY
Qty: 30 TABLET | Refills: 3 | Status: SHIPPED | OUTPATIENT
Start: 2024-07-11

## 2024-07-11 RX ADMIN — SODIUM CHLORIDE, PRESERVATIVE FREE 10 ML: 5 INJECTION INTRAVENOUS at 09:06

## 2024-07-11 RX ADMIN — ASPIRIN 81 MG 81 MG: 81 TABLET ORAL at 09:09

## 2024-07-11 RX ADMIN — APIXABAN 5 MG: 5 TABLET, FILM COATED ORAL at 09:05

## 2024-07-11 RX ADMIN — AMIODARONE HYDROCHLORIDE 200 MG: 200 TABLET ORAL at 09:05

## 2024-07-11 ASSESSMENT — ENCOUNTER SYMPTOMS
RESPIRATORY NEGATIVE: 1
EYES NEGATIVE: 1
ALLERGIC/IMMUNOLOGIC NEGATIVE: 1
GASTROINTESTINAL NEGATIVE: 1

## 2024-07-11 NOTE — PROGRESS NOTES
24 hour(s))   Anti-Xa, Unfractionated Heparin    Collection Time: 07/10/24  2:09 PM   Result Value Ref Range    Anti-XA Unfrac Heparin 0.46 IU/L   Anti-Xa, Unfractionated Heparin    Collection Time: 07/10/24  9:08 PM   Result Value Ref Range    Anti-XA Unfrac Heparin 0.15 IU/L   Anti-Xa, Unfractionated Heparin    Collection Time: 07/10/24 11:59 PM   Result Value Ref Range    Anti-XA Unfrac Heparin 1.30 IU/L   Basic Metabolic Panel w/ Reflex to MG    Collection Time: 07/11/24  6:39 AM   Result Value Ref Range    Sodium 138 136 - 145 mmol/L    Potassium 3.9 3.7 - 5.3 mmol/L    Chloride 105 98 - 107 mmol/L    CO2 23 20 - 31 mmol/L    Anion Gap 10 9 - 16 mmol/L    Glucose 106 (H) 74 - 99 mg/dL    BUN 7 6 - 20 mg/dL    Creatinine 1.2 0.70 - 1.20 mg/dL    Est, Glom Filt Rate 74 >60 mL/min/1.73m2    Calcium 8.8 8.6 - 10.4 mg/dL   CBC with Auto Differential    Collection Time: 07/11/24  6:39 AM   Result Value Ref Range    WBC 4.7 3.5 - 11.3 k/uL    RBC 6.14 (H) 4.21 - 5.77 m/uL    Hemoglobin 15.3 13.0 - 17.0 g/dL    Hematocrit 50.0 40.7 - 50.3 %    MCV 81.4 (L) 82.6 - 102.9 fL    MCH 24.9 (L) 25.2 - 33.5 pg    MCHC 30.6 28.4 - 34.8 g/dL    RDW 17.7 (H) 11.8 - 14.4 %    Platelets 224 138 - 453 k/uL    MPV 9.7 8.1 - 13.5 fL    NRBC Automated 0.0 0.0 per 100 WBC    Neutrophils % 64 36 - 65 %    Lymphocytes % 25 24 - 43 %    Monocytes % 9 3 - 12 %    Eosinophils % 2 1 - 4 %    Basophils % 0 0 - 2 %    Immature Granulocytes % 0 0 %    Neutrophils Absolute 2.99 1.50 - 8.10 k/uL    Lymphocytes Absolute 1.17 1.10 - 3.70 k/uL    Monocytes Absolute 0.44 0.10 - 1.20 k/uL    Eosinophils Absolute 0.10 0.00 - 0.44 k/uL    Basophils Absolute <0.03 0.00 - 0.20 k/uL    Immature Granulocytes Absolute <0.03 0.00 - 0.30 k/uL    RBC Morphology ANISOCYTOSIS PRESENT MICROCYTOSIS PRESENT    Anti-Xa, Unfractionated Heparin    Collection Time: 07/11/24  6:39 AM   Result Value Ref Range    Anti-XA Unfrac Heparin 0.79 IU/L     Recent Labs      discussed and all their questions were answered.      Follow-up further recommendations after discussing case with the attending.  The plan was discussed with the patient, patient's family and the medical staff.   Consultations:   IP CONSULT TO CARDIOLOGY    Patient is admitted as inpatient status because of co-morbidities listed above, severity of signs and symptoms as outlined, requirement for current medical therapies and most importantly because of direct risk to patient if care not provided in a hospital setting.    Jeannette oRck MD  Neurology Resident PGY-2  7/11/2024  8:38 AM    Copy sent to Dr. Kramer primary care provider on file.

## 2024-07-11 NOTE — PROGRESS NOTES
CLINICAL PHARMACY NOTE: MEDS TO BEDS    Total # of Prescriptions Filled: 4   The following medications were delivered to the patient:  Eliquis  Aspirin  Amiodarone  atorvastatin    Additional Documentation:

## 2024-07-11 NOTE — DISCHARGE INSTRUCTIONS
Follow up with your primary care physician, in 7 days  Follow up with your cardiologists within 1 week   Follow up with neurologist within 2 mointh  Take all your medication as prescribed . If your prescription has refills, obtain the medication refills from the pharmacy before you run out. Seek medical attention if you run out of a medication you need and do not have any refills of.   Reasons to call your doctor or go to the ER: weakness numbness, or any other concerning symptoms.

## 2024-07-11 NOTE — PROGRESS NOTES
Patient is discharged. Pharmacy dropped patients medications off. RN provided patient with discharge instructions and education. Patient ambulated off unit.

## 2024-07-11 NOTE — CARE COORDINATION
Transitional Planning  Called outpatient pharmacy, spoke with Liam, patient has 0 co pay for eliquis, update provided to neuro team.  Plan is home.      Discharge Report    [facility]  Clinical Case Management Department  Written by: Adelia Ferro RN    Patient Name: Alberto Torres  Attending Provider: Axel Sierra DO  Admit Date: 2024  9:51 AM  MRN: [id number]  Account: [id number]                     : 1970  Discharge Date: 24      Disposition: home    Adelia Ferro RN

## 2024-07-11 NOTE — PROGRESS NOTES
SLP ALL NOTES  Speech Language Pathology  [facility]    Cognitive/Speech Treatment Note    Date: 7/11/2024  Patient’s Name: Alberto Torres  MRN: [id number]  Diagnosis:   Patient Active Problem List   Diagnosis Code    Acute ischemic left MCA stroke (HCC) I63.512    History of atrial fibrillation Z86.79    Subtherapeutic international normalized ratio (INR) R79.1    Cerebrovascular accident (CVA) due to occlusion of left middle cerebral artery (HCC) I63.512    Atrial fibrillation with rapid ventricular response (HCC) I48.91    Cerebrovascular accident (CVA) due to embolism of left middle cerebral artery (HCC) I63.412       Pain: 0/10    Cognitive Treatment    Treatment time: 8858-0024      Subjective: [x] Alert [x] Cooperative     [] Confused     [] Agitated    [] Lethargic      Objective/Assessment:      Problem Solving/Reasoning: Word deductions: 14/15 increased to 15/15 with repetition/min verbal cue    Antonyms: 100% independently    Stating situational problems: 100% independently    Dysarthria: Pt's speech is 100% intelligible. Pt reports speech has returned to baseline and has no further concerns.    Pt completed cognitive tasks without difficulty this date and feels cognition is at baseline. Pt demonstrated good self/safety awareness in conversation. No further ST is warranted at this time.    Plan:  [] Continue ST services    [x] Discharge from ST:      Discharge recommendations: []  Further therapy recommended at discharge.The patient should be able to tolerate at least 3 hours of therapy per day over 5 days or 15 hours over 7 days. [] Further therapy recommended at discharge.   [x] No therapy recommended at discharge.          Treatment completed by: Gracie Hamilton, SLP, M.S. [facility]

## 2024-07-11 NOTE — PLAN OF CARE
Problem: Discharge Planning  Goal: Discharge to home or other facility with appropriate resources  7/11/2024 0501 by Camille Vidales RN  Outcome: Progressing  7/10/2024 1717 by Brittaney Carrion  Outcome: Progressing     Problem: Pain  Goal: Verbalizes/displays adequate comfort level or baseline comfort level  7/11/2024 0501 by Camille Vidales RN  Outcome: Progressing  7/10/2024 1717 by Brittaney Carrion  Outcome: Progressing     Problem: Chronic Conditions and Co-morbidities  Goal: Patient's chronic conditions and co-morbidity symptoms are monitored and maintained or improved  7/11/2024 0501 by Camille Vidales RN  Outcome: Progressing  7/10/2024 1717 by Brittaney Carrion  Outcome: Progressing     Problem: Safety - Adult  Goal: Free from fall injury  7/11/2024 0501 by Camille Vidales RN  Outcome: Progressing  7/10/2024 1717 by Brittaney Carrion  Outcome: Progressing     Problem: ABCDS Injury Assessment  Goal: Absence of physical injury  7/11/2024 0501 by Camille Vidales RN  Outcome: Progressing  7/10/2024 1717 by Brittaney Carrion  Outcome: Progressing

## 2024-07-11 NOTE — PROGRESS NOTES
[facility]  Progress Note                   Date:   7/11/2024  Patient name: Alberto Torres  Date of admission:  7/8/2024  9:51 AM  MRN:   [id number]  YOB: 1970  PCP: No primary care provider on file.    Reason for Admission: Acute ischemic left MCA stroke (HCC) [I63.512]  Cerebrovascular accident (CVA) due to occlusion of left middle cerebral artery (HCC) [I63.512]  Cerebrovascular accident (CVA) due to embolism of left middle cerebral artery (HCC) [I63.412]    Subjective:       Clinical Changes /Abnormalities:Patient seen and examined in room. Denies CP or SOB. No acute CV events overnight. Labs, vitals, and tele reviewed.   Up to side of bed - eating breakfast. Reports following with [facility]     Review of Systems    Medications:   Scheduled Meds:   amiodarone  200 mg Oral BID    apixaban  5 mg Oral BID    aspirin  81 mg Oral Daily    sodium chloride flush  5-40 mL IntraVENous 2 times per day    atorvastatin  80 mg Oral Nightly     Continuous Infusions:   sodium chloride       CBC:   Recent Labs     07/09/24  0507 07/10/24  0544 07/11/24  0639   WBC 5.2 4.7 4.7   HGB 15.6 15.8 15.3    227 224     BMP:    Recent Labs     07/09/24  0507 07/10/24  0544 07/11/24  0639   * 137 138   K 3.4* 3.5* 3.9   CL 99 102 105   CO2 24 24 23   BUN 10 8 7   CREATININE 1.1 1.1 1.2   GLUCOSE 130* 113* 106*     Hepatic:No results for input(s): \"AST\", \"ALT\", \"BILITOT\", \"ALKPHOS\" in the last 72 hours.    Invalid input(s): \"ALB\"  Troponin: No results for input(s): \"TROPHS\" in the last 72 hours.  BNP: No results for input(s): \"BNP\" in the last 72 hours.  Lipids:   Recent Labs     07/09/24  0507   CHOL 145   HDL 46     INR:   Recent Labs     07/08/24  1146   INR 1.1       Objective:   Vitals: /87   Pulse 86   Temp 97.8 °F (36.6 °C) (Oral)   Resp 17   Ht 1.854 m (6' 1\")   Wt 74.8 kg (165 lb)   SpO2 97%   BMI 21.77 kg/m²   General appearance: alert and cooperative with

## 2024-07-11 NOTE — DISCHARGE SUMMARY
occlusion. INR 1.2. Transferred to [facility] for further assessment by endovascular neurosurgery. CT perfusion did show no perfusion mismatch with a stable focal hypodensity within the posterior left frontal and small hyperdensity within M2 branch of left MCA less prominent   Echocardiography showed EF of 50-55%, mild global hypokinesis, and severe dilation of left and right atrium.   Cardiologist was consulted, patient was started on amiodarone due to A-fib, patient was started on Eliquis 5 mg twice daily Aspirin and lipitor.  TSH outpatient in 6 weeks   Patient enrolled into medical arm of [employer] trial     Stroke etiology: cardioembolic    GENERAL   Appears comfortable and in no distress  HEENT   NC/ AT  HEART   S1 and S2 heard; palpation of pulses: radial pulse   NECK   Supple and no bruits heard  MENTAL STATUS:   Alert, oriented, intact memory, no confusion, normal speech, normal language, no hallucination or delusion  CRANIAL NERVES:  II     -      Visual fields intact to confrontation  III,IV,VI -  PERR, EOMs full, no ptosis  V     -     Normal facial sensation   VII    -     mild right facial drop  VIII   -     Intact hearing   IX,X -     Symmetrical palate  XI    -     Symmetrical shoulder shrug  XII   -     Midline tongue, no atrophy   MOTOR FUNCTION:  RUE: Significant for good strength of grade 5/5 in proximal and distal muscle groups   LUE: Significant for good strength of grade 5/5 in proximal and distal muscle groups   RLE: Significant for good strength of grade 5/5 in proximal and distal muscle groups   LLE: Significant for good strength of grade 5/5 in proximal and distal muscle groups       Normal bulk, normal tone and no involuntary movements, no tremor  SENSORY FUNCTION:   Normal touch, normal pinprick, normal vibration, normal proprioception  CEREBELLAR FUNCTION:   Intact fine motor control over upper limbs and lower limbs  REFLEX FUNCTION:   Symmetric in upper and lower extremities,  in preparing discharge papers, discussing discharge with patient, medication review, etc.      Jeannette Rock MD,  Neurology Resident PGY-2  Department of Neurology  [place], [place]  7/11/2024, 4:18 PM        \

## 2024-07-11 NOTE — DISCHARGE SUMMARY
[facility]     Department of Neurology    INPATIENT DISCHARGE SUMMARY        Patient Identification:  Alberto Torres is a 53 y.o. male.  :  1970  MRN: [id number]     Acct: [id number]   Admit Date:  2024  Discharge date and time: No discharge date for patient encounter.   Attending Provider: Axel Sierra DO                                     Admission Diagnoses:   Acute ischemic left MCA stroke (HCC) [I63.512]  Cerebrovascular accident (CVA) due to occlusion of left middle cerebral artery (HCC) [I63.512]  Cerebrovascular accident (CVA) due to embolism of left middle cerebral artery (HCC) [I63.412]    Discharge Diagnoses:   Principal Problem:    Acute ischemic left MCA stroke (HCC)  Active Problems:    History of atrial fibrillation    Subtherapeutic international normalized ratio (INR)    Cerebrovascular accident (CVA) due to occlusion of left middle cerebral artery (HCC)    Atrial fibrillation with rapid ventricular response (HCC)    Cerebrovascular accident (CVA) due to embolism of left middle cerebral artery (HCC)  Resolved Problems:    * No resolved hospital problems. *       Consults:   endovascular    Brief Inpatient course:    The patient is a 53 y.o.  Non- / non  male who presents with Facial Droop (Right sided)  Patient went to bed  at 11 PM  and woke up  with symptoms at 6 AM. Patient has symptoms of some dysarthria and mild difficulty getting her speech out. He has no history of stroke, does have a history of atrial fibrillation, on Coumadin with non compliance. He was not compliant with medication. Past medical history is also positive for anxiety, bipolar disorder, depression, and cocaine/smoking use. But, the patient reported that he is off cocaine for 7 years and had recently taken in past days. He presented to [facility]. Initial NIHSS 2 for R facial droop, drooling of saliva and dysarthria. CTH showed a hyperdense left M2, CTA head  in upper and lower extremities, no Babinski sign  STATION and GAIT  deffered         NIHSS at discharge   No discharge date for patient encounter.   Time Performed:    1a.  Level of consciousness:  0 - alert; keenly responsive  1b.  Level of consciousness questions:  0 - answers both questions correctly  1c.  Level of consciousness questions:  0 - performs both tasks correctly  2.    Best Gaze:  0 - normal  3.    Visual:  0 - no visual loss  4.    Facial Palsy:  1 - minor paralysis (flattened nasolabial fold, asymmetric on smiling)  5a.  Motor left arm:  0 - no drift, limb holds 90 (or 45) degrees for full 10 seconds  5b.  Motor right arm:  0 - no drift, limb holds 90 (or 45) degrees for full 10 seconds  6a.  Motor left le - no drift; leg holds 30 degree position for full 5 seconds  6b.  Motor right le - no drift; leg holds 30 degree position for full 5 seconds  7.    Limb Ataxia:  0 - absent  8.    Sensory:  0 - normal; no sensory loss  9.    Best Language:  0 - no aphasia, normal  10.  Dysarthria: normal  11.  Extinction and Inattention:  0 - no abnormality    TOTAL:  1   Modified Uvalde Score Scale (at discharge):   [x] Zero: No symptoms at all   [] 1: No significant disability despite symptoms; able to carry out all usual duties and activities   [] 2: Slight disability; unable to carry out all previous activities, but able to look after own affairs without assistance   [] 3:Moderate disability; requiring some help, but able to walk without assistance   [] 4: Moderately severe disability; unable to walk and attend to bodily needs without assistance   [] 5:Severe disability; bedridden, incontinent and requiring constant nursing care and attention       Patient is stable from neurological standpoint to be discharged.    Procedures:  none    Any Hospital Acquired Infections: none    Discharge Functional Status:  stable    Disposition: home    Patient Instructions:   Follow up with your primary care

## 2024-07-11 NOTE — PROGRESS NOTES
Physical Therapy  Facility/Department: [address] STE[address]OWN  Physical Therapy Progress Note    Name: Alberto Torres  : 1970  MRN: [id number]  Date of Service: 2024    Discharge Recommendations:  No further therapy required at discharge.         PT Equipment Recommendations  Equipment Needed: No      Assessment   Pt is independent with all mobility, continued PT not necessary.  DC PT with goals met  Therapy Prognosis: Good  Decision Making: Low Complexity  Requires PT Follow-Up: No  Activity Tolerance  Activity Tolerance: Patient tolerated treatment well     Plan   Physical Therapy Plan  General Plan: Discharge  Current Treatment Recommendations: Balance training, Functional mobility training, Transfer training, Stair training, Gait training, Home exercise program, Safety education & training, Patient/Caregiver education & training, Therapeutic activities  Safety Devices  Type of Devices: Call light within reach, Left in bed (pt sitting on EOB at end of PT session, declined chair or lying down in bed)  Restraints  Restraints Initially in Place: No     Restrictions  Restrictions/Precautions  Restrictions/Precautions: Up as Tolerated  Required Braces or Orthoses?: No  Position Activity Restriction  Other position/activity restrictions: -180 mmHg,  L MCA M2 occlusion     Subjective   General  Patient assessed for rehabilitation services?: Yes  Response To Previous Treatment: Patient with no complaints from previous session.  Family / Caregiver Present: No  Follows Commands: Within Functional Limits  Subjective  Subjective: no c/o pain    Cognition   Orientation  Overall Orientation Status: Within Functional Limits  Cognition  Overall Cognitive Status: WFL     Objective   Temp: 98.1 °F (36.7 °C)  Pulse: 99  Heart Rate Source: Monitor  Respirations: 19  SpO2: 98 %  O2 Device: None (Room air)  BP: 128/86  MAP (Calculated): 100  BP Location: Left upper arm  BP Method: Automatic  Patient Position: Semi fowlers      Observation/Palpation  Posture: Good        AROM RLE (degrees)  RLE AROM: WNL  AROM LLE (degrees)  LLE AROM : WNL  AROM RUE (degrees)  RUE AROM : WNL  AROM LUE (degrees)  LUE AROM : WNL  Strength RLE  Strength RLE: WNL  Strength LLE  Strength LLE: WNL  Strength RUE  Strength RUE: WNL  Strength LUE  Strength LUE: WNL           Bed mobility  Rolling to Left: Independent  Rolling to Right: Independent  Supine to Sit: Independent  Sit to Supine: Independent  Scooting: Independent  Transfers  Sit to Stand: Independent  Stand to Sit: Independent  Stand Pivot Transfers: Independent  Comment: Assessed without assistive device.  Ambulation  Surface: Level tile  Device: No Device  Assistance: Independent  Gait Deviations: None  Distance: 460'x1  Comments: good, steady gait, no LOB  Stairs/Curb  Stairs?: Yes  Stairs  # Steps : 10  Rails: None  Curbs: 6\"  Device: No Device  Assistance: Independent  Comment: pt able to \"run\" up the stairs w/o HR w/o LOB     Balance  Posture: Good  Sitting - Static: Good  Sitting - Dynamic: Good  Standing - Static: Good  Standing - Dynamic: Good  Comments: Standing assessed without assistive device.  Dynamic Standing Balance Exercises: A/P lunges x 15 reps, pt independent, no LOB    AM-PAC - Mobility    AM-PAC Basic Mobility - Inpatient   How much help is needed turning from your back to your side while in a flat bed without using bedrails?: None  How much help is needed moving from lying on your back to sitting on the side of a flat bed without using bedrails?: None  How much help is needed moving to and from a bed to a chair?: None  How much help is needed standing up from a chair using your arms?: None  How much help is needed walking in hospital room?: None  How much help is needed climbing 3-5 steps with a railing?: None  AM-PAC Inpatient Mobility Raw Score : 24  AM-PAC Inpatient T-Scale Score : 61.14  Mobility Inpatient CMS 0-100% Score: 0  Mobility Inpatient CMS G-Code Modifier :

## 2025-07-11 ENCOUNTER — APPOINTMENT (OUTPATIENT)
Dept: GENERAL RADIOLOGY | Age: 55
End: 2025-07-11
Payer: COMMERCIAL

## 2025-07-11 ENCOUNTER — APPOINTMENT (OUTPATIENT)
Dept: CT IMAGING | Age: 55
End: 2025-07-11
Payer: COMMERCIAL

## 2025-07-11 ENCOUNTER — APPOINTMENT (OUTPATIENT)
Dept: VASCULAR LAB | Age: 55
End: 2025-07-11
Payer: COMMERCIAL

## 2025-07-11 ENCOUNTER — HOSPITAL ENCOUNTER (INPATIENT)
Age: 55
LOS: 7 days | Discharge: HOME | End: 2025-07-18
Attending: EMERGENCY MEDICINE | Admitting: INTERNAL MEDICINE
Payer: COMMERCIAL

## 2025-07-11 DIAGNOSIS — I34.0 NONRHEUMATIC MITRAL VALVE REGURGITATION: ICD-10-CM

## 2025-07-11 DIAGNOSIS — R60.0 BILATERAL LEG EDEMA: Primary | ICD-10-CM

## 2025-07-11 DIAGNOSIS — I34.0 MITRAL VALVE REGURGITATION: ICD-10-CM

## 2025-07-11 DIAGNOSIS — I50.21 ACUTE SYSTOLIC CONGESTIVE HEART FAILURE (HCC): ICD-10-CM

## 2025-07-11 DIAGNOSIS — I42.0 DILATED CARDIOMYOPATHY (HCC): ICD-10-CM

## 2025-07-11 DIAGNOSIS — I48.0 PAROXYSMAL ATRIAL FIBRILLATION (HCC): ICD-10-CM

## 2025-07-11 DIAGNOSIS — I42.9 CARDIOMYOPATHY (HCC): ICD-10-CM

## 2025-07-11 DIAGNOSIS — I50.9 CONGESTIVE HEART FAILURE, UNSPECIFIED HF CHRONICITY, UNSPECIFIED HEART FAILURE TYPE (HCC): ICD-10-CM

## 2025-07-11 DIAGNOSIS — R06.02 SHORTNESS OF BREATH: ICD-10-CM

## 2025-07-11 LAB
ALBUMIN SERPL-MCNC: 3.3 G/DL (ref 3.5–5.2)
ALBUMIN/GLOB SERPL: 1.4 {RATIO} (ref 1–2.5)
ALP SERPL-CCNC: 142 U/L (ref 40–129)
ALT SERPL-CCNC: 43 U/L (ref 10–50)
AMPHET UR QL SCN: NEGATIVE
ANION GAP SERPL CALCULATED.3IONS-SCNC: 13 MMOL/L (ref 9–16)
AST SERPL-CCNC: 39 U/L (ref 10–50)
B PARAP IS1001 DNA NPH QL NAA+NON-PROBE: NOT DETECTED
B PERT DNA SPEC QL NAA+PROBE: NOT DETECTED
BARBITURATES UR QL SCN: NEGATIVE
BASOPHILS # BLD: <0.03 K/UL (ref 0–0.2)
BASOPHILS NFR BLD: 0 % (ref 0–2)
BENZODIAZ UR QL: NEGATIVE
BILIRUB DIRECT SERPL-MCNC: 0.2 MG/DL (ref 0–0.2)
BILIRUB INDIRECT SERPL-MCNC: 0.3 MG/DL (ref 0–1)
BILIRUB SERPL-MCNC: 0.5 MG/DL (ref 0–1.2)
BNP SERPL-MCNC: 4772 PG/ML (ref 0–125)
BUN SERPL-MCNC: 12 MG/DL (ref 6–20)
C PNEUM DNA NPH QL NAA+NON-PROBE: NOT DETECTED
CALCIUM SERPL-MCNC: 8.7 MG/DL (ref 8.6–10.4)
CANNABINOIDS UR QL SCN: POSITIVE
CHLORIDE SERPL-SCNC: 104 MMOL/L (ref 98–107)
CO2 SERPL-SCNC: 24 MMOL/L (ref 20–31)
COCAINE UR QL SCN: POSITIVE
CREAT SERPL-MCNC: 1.2 MG/DL (ref 0.7–1.2)
EOSINOPHIL # BLD: 0.05 K/UL (ref 0–0.44)
EOSINOPHILS RELATIVE PERCENT: 1 % (ref 1–4)
ERYTHROCYTE [DISTWIDTH] IN BLOOD BY AUTOMATED COUNT: 16.5 % (ref 11.8–14.4)
FENTANYL UR QL: NEGATIVE
FLUAV RNA NPH QL NAA+NON-PROBE: NOT DETECTED
FLUBV RNA NPH QL NAA+NON-PROBE: NOT DETECTED
GFR, ESTIMATED: 72 ML/MIN/1.73M2
GLOBULIN SER CALC-MCNC: 2.3 G/DL
GLUCOSE SERPL-MCNC: 99 MG/DL (ref 74–99)
HADV DNA NPH QL NAA+NON-PROBE: NOT DETECTED
HCOV 229E RNA NPH QL NAA+NON-PROBE: NOT DETECTED
HCOV HKU1 RNA NPH QL NAA+NON-PROBE: NOT DETECTED
HCOV NL63 RNA NPH QL NAA+NON-PROBE: NOT DETECTED
HCOV OC43 RNA NPH QL NAA+NON-PROBE: NOT DETECTED
HCT VFR BLD AUTO: 49.1 % (ref 40.7–50.3)
HGB BLD-MCNC: 15.5 G/DL (ref 13–17)
HMPV RNA NPH QL NAA+NON-PROBE: NOT DETECTED
HPIV1 RNA NPH QL NAA+NON-PROBE: NOT DETECTED
HPIV2 RNA NPH QL NAA+NON-PROBE: NOT DETECTED
HPIV3 RNA NPH QL NAA+NON-PROBE: NOT DETECTED
HPIV4 RNA NPH QL NAA+NON-PROBE: NOT DETECTED
IMM GRANULOCYTES # BLD AUTO: <0.03 K/UL (ref 0–0.3)
IMM GRANULOCYTES NFR BLD: 0 %
INR PPP: 1.1
LYMPHOCYTES NFR BLD: 0.84 K/UL (ref 1.1–3.7)
LYMPHOCYTES RELATIVE PERCENT: 16 % (ref 24–43)
M PNEUMO DNA NPH QL NAA+NON-PROBE: NOT DETECTED
MCH RBC QN AUTO: 26.8 PG (ref 25.2–33.5)
MCHC RBC AUTO-ENTMCNC: 31.6 G/DL (ref 28.4–34.8)
MCV RBC AUTO: 84.8 FL (ref 82.6–102.9)
METHADONE UR QL: NEGATIVE
MONOCYTES NFR BLD: 0.38 K/UL (ref 0.1–1.2)
MONOCYTES NFR BLD: 7 % (ref 3–12)
NEUTROPHILS NFR BLD: 76 % (ref 36–65)
NEUTS SEG NFR BLD: 3.99 K/UL (ref 1.5–8.1)
NRBC BLD-RTO: 0 PER 100 WBC
OPIATES UR QL SCN: NEGATIVE
OXYCODONE UR QL SCN: NEGATIVE
PARTIAL THROMBOPLASTIN TIME: 26.4 SEC (ref 23–36.5)
PCP UR QL SCN: NEGATIVE
PLATELET # BLD AUTO: 317 K/UL (ref 138–453)
PMV BLD AUTO: 9.2 FL (ref 8.1–13.5)
POTASSIUM SERPL-SCNC: 3.9 MMOL/L (ref 3.7–5.3)
PROT SERPL-MCNC: 5.6 G/DL (ref 6.6–8.7)
PROTHROMBIN TIME: 14.9 SEC (ref 11.7–14.9)
RBC # BLD AUTO: 5.79 M/UL (ref 4.21–5.77)
RBC # BLD: ABNORMAL 10*6/UL
RSV RNA NPH QL NAA+NON-PROBE: NOT DETECTED
RV+EV RNA NPH QL NAA+NON-PROBE: NOT DETECTED
SARS-COV-2 RNA NPH QL NAA+NON-PROBE: NOT DETECTED
SODIUM SERPL-SCNC: 141 MMOL/L (ref 136–145)
SPECIMEN DESCRIPTION: NORMAL
TEST INFORMATION: ABNORMAL
TROPONIN I SERPL HS-MCNC: 20 NG/L (ref 0–22)
TROPONIN I SERPL HS-MCNC: 24 NG/L (ref 0–22)
TSH SERPL DL<=0.05 MIU/L-ACNC: 0.82 UIU/ML (ref 0.27–4.2)
WBC OTHER # BLD: 5.3 K/UL (ref 3.5–11.3)

## 2025-07-11 PROCEDURE — 84443 ASSAY THYROID STIM HORMONE: CPT

## 2025-07-11 PROCEDURE — 36600 WITHDRAWAL OF ARTERIAL BLOOD: CPT

## 2025-07-11 PROCEDURE — 80048 BASIC METABOLIC PNL TOTAL CA: CPT

## 2025-07-11 PROCEDURE — 6360000002 HC RX W HCPCS

## 2025-07-11 PROCEDURE — 93005 ELECTROCARDIOGRAM TRACING: CPT

## 2025-07-11 PROCEDURE — 2060000000 HC ICU INTERMEDIATE R&B

## 2025-07-11 PROCEDURE — 85025 COMPLETE CBC W/AUTO DIFF WBC: CPT

## 2025-07-11 PROCEDURE — 99285 EMERGENCY DEPT VISIT HI MDM: CPT

## 2025-07-11 PROCEDURE — 84484 ASSAY OF TROPONIN QUANT: CPT

## 2025-07-11 PROCEDURE — 82803 BLOOD GASES ANY COMBINATION: CPT

## 2025-07-11 PROCEDURE — 83880 ASSAY OF NATRIURETIC PEPTIDE: CPT

## 2025-07-11 PROCEDURE — 85730 THROMBOPLASTIN TIME PARTIAL: CPT

## 2025-07-11 PROCEDURE — 2580000003 HC RX 258

## 2025-07-11 PROCEDURE — 93970 EXTREMITY STUDY: CPT

## 2025-07-11 PROCEDURE — 85610 PROTHROMBIN TIME: CPT

## 2025-07-11 PROCEDURE — 2500000003 HC RX 250 WO HCPCS

## 2025-07-11 PROCEDURE — 80307 DRUG TEST PRSMV CHEM ANLYZR: CPT

## 2025-07-11 PROCEDURE — 71260 CT THORAX DX C+: CPT

## 2025-07-11 PROCEDURE — 6360000004 HC RX CONTRAST MEDICATION

## 2025-07-11 PROCEDURE — 0202U NFCT DS 22 TRGT SARS-COV-2: CPT

## 2025-07-11 PROCEDURE — 71045 X-RAY EXAM CHEST 1 VIEW: CPT

## 2025-07-11 PROCEDURE — 6370000000 HC RX 637 (ALT 250 FOR IP)

## 2025-07-11 PROCEDURE — 80076 HEPATIC FUNCTION PANEL: CPT

## 2025-07-11 PROCEDURE — 96374 THER/PROPH/DIAG INJ IV PUSH: CPT

## 2025-07-11 RX ORDER — ACETAMINOPHEN 325 MG/1
650 TABLET ORAL EVERY 6 HOURS PRN
Status: DISCONTINUED | OUTPATIENT
Start: 2025-07-11 | End: 2025-07-18 | Stop reason: HOSPADM

## 2025-07-11 RX ORDER — IOPAMIDOL 755 MG/ML
75 INJECTION, SOLUTION INTRAVASCULAR
Status: COMPLETED | OUTPATIENT
Start: 2025-07-11 | End: 2025-07-11

## 2025-07-11 RX ORDER — POLYETHYLENE GLYCOL 3350 17 G/17G
17 POWDER, FOR SOLUTION ORAL DAILY PRN
Status: DISCONTINUED | OUTPATIENT
Start: 2025-07-11 | End: 2025-07-18 | Stop reason: HOSPADM

## 2025-07-11 RX ORDER — ONDANSETRON 2 MG/ML
4 INJECTION INTRAMUSCULAR; INTRAVENOUS EVERY 6 HOURS PRN
Status: DISCONTINUED | OUTPATIENT
Start: 2025-07-11 | End: 2025-07-18 | Stop reason: HOSPADM

## 2025-07-11 RX ORDER — ACETAMINOPHEN 650 MG/1
650 SUPPOSITORY RECTAL EVERY 6 HOURS PRN
Status: DISCONTINUED | OUTPATIENT
Start: 2025-07-11 | End: 2025-07-18 | Stop reason: HOSPADM

## 2025-07-11 RX ORDER — SODIUM CHLORIDE 9 MG/ML
INJECTION, SOLUTION INTRAVENOUS PRN
Status: DISCONTINUED | OUTPATIENT
Start: 2025-07-11 | End: 2025-07-18 | Stop reason: HOSPADM

## 2025-07-11 RX ORDER — SODIUM CHLORIDE 0.9 % (FLUSH) 0.9 %
5-40 SYRINGE (ML) INJECTION EVERY 12 HOURS SCHEDULED
Status: DISCONTINUED | OUTPATIENT
Start: 2025-07-11 | End: 2025-07-18 | Stop reason: HOSPADM

## 2025-07-11 RX ORDER — DILTIAZEM HYDROCHLORIDE 5 MG/ML
0.25 INJECTION INTRAVENOUS ONCE
Status: COMPLETED | OUTPATIENT
Start: 2025-07-11 | End: 2025-07-11

## 2025-07-11 RX ORDER — DILTIAZEM HYDROCHLORIDE 300 MG/1
300 CAPSULE, COATED, EXTENDED RELEASE ORAL DAILY
Status: ON HOLD | COMMUNITY
Start: 2024-12-15 | End: 2025-07-18 | Stop reason: HOSPADM

## 2025-07-11 RX ORDER — ONDANSETRON 4 MG/1
4 TABLET, ORALLY DISINTEGRATING ORAL EVERY 8 HOURS PRN
Status: DISCONTINUED | OUTPATIENT
Start: 2025-07-11 | End: 2025-07-18 | Stop reason: HOSPADM

## 2025-07-11 RX ORDER — FUROSEMIDE 10 MG/ML
40 INJECTION INTRAMUSCULAR; INTRAVENOUS ONCE
Status: COMPLETED | OUTPATIENT
Start: 2025-07-11 | End: 2025-07-11

## 2025-07-11 RX ORDER — SODIUM CHLORIDE 0.9 % (FLUSH) 0.9 %
5-40 SYRINGE (ML) INJECTION PRN
Status: DISCONTINUED | OUTPATIENT
Start: 2025-07-11 | End: 2025-07-18 | Stop reason: HOSPADM

## 2025-07-11 RX ORDER — ATORVASTATIN CALCIUM 80 MG/1
80 TABLET, FILM COATED ORAL NIGHTLY
Status: DISCONTINUED | OUTPATIENT
Start: 2025-07-11 | End: 2025-07-18 | Stop reason: HOSPADM

## 2025-07-11 RX ORDER — MAGNESIUM SULFATE IN WATER 40 MG/ML
2000 INJECTION, SOLUTION INTRAVENOUS PRN
Status: DISCONTINUED | OUTPATIENT
Start: 2025-07-11 | End: 2025-07-18 | Stop reason: HOSPADM

## 2025-07-11 RX ORDER — LABETALOL HYDROCHLORIDE 5 MG/ML
10 INJECTION, SOLUTION INTRAVENOUS EVERY 4 HOURS PRN
Status: DISCONTINUED | OUTPATIENT
Start: 2025-07-11 | End: 2025-07-18 | Stop reason: HOSPADM

## 2025-07-11 RX ORDER — HYDRALAZINE HYDROCHLORIDE 20 MG/ML
10 INJECTION INTRAMUSCULAR; INTRAVENOUS EVERY 6 HOURS PRN
Status: DISCONTINUED | OUTPATIENT
Start: 2025-07-11 | End: 2025-07-18 | Stop reason: HOSPADM

## 2025-07-11 RX ORDER — AMLODIPINE BESYLATE 10 MG/1
5 TABLET ORAL DAILY
Status: DISCONTINUED | OUTPATIENT
Start: 2025-07-11 | End: 2025-07-18 | Stop reason: HOSPADM

## 2025-07-11 RX ORDER — ASPIRIN 81 MG/1
81 TABLET, CHEWABLE ORAL DAILY
Status: DISCONTINUED | OUTPATIENT
Start: 2025-07-11 | End: 2025-07-18 | Stop reason: HOSPADM

## 2025-07-11 RX ORDER — POTASSIUM CHLORIDE 7.45 MG/ML
10 INJECTION INTRAVENOUS PRN
Status: DISCONTINUED | OUTPATIENT
Start: 2025-07-11 | End: 2025-07-18 | Stop reason: HOSPADM

## 2025-07-11 RX ORDER — POTASSIUM CHLORIDE 1500 MG/1
40 TABLET, EXTENDED RELEASE ORAL PRN
Status: DISCONTINUED | OUTPATIENT
Start: 2025-07-11 | End: 2025-07-18 | Stop reason: HOSPADM

## 2025-07-11 RX ORDER — CARVEDILOL 12.5 MG/1
6.25 TABLET ORAL 2 TIMES DAILY
Status: DISCONTINUED | OUTPATIENT
Start: 2025-07-11 | End: 2025-07-13

## 2025-07-11 RX ORDER — ENOXAPARIN SODIUM 100 MG/ML
40 INJECTION SUBCUTANEOUS DAILY
Status: DISCONTINUED | OUTPATIENT
Start: 2025-07-11 | End: 2025-07-11

## 2025-07-11 RX ADMIN — CARVEDILOL 6.25 MG: 12.5 TABLET, FILM COATED ORAL at 20:36

## 2025-07-11 RX ADMIN — SODIUM CHLORIDE, PRESERVATIVE FREE 10 ML: 5 INJECTION INTRAVENOUS at 20:39

## 2025-07-11 RX ADMIN — APIXABAN 5 MG: 5 TABLET, FILM COATED ORAL at 20:36

## 2025-07-11 RX ADMIN — FUROSEMIDE 40 MG: 10 INJECTION, SOLUTION INTRAMUSCULAR; INTRAVENOUS at 18:41

## 2025-07-11 RX ADMIN — ASPIRIN 81 MG: 81 TABLET, CHEWABLE ORAL at 19:14

## 2025-07-11 RX ADMIN — DILTIAZEM HYDROCHLORIDE 5 MG/HR: 5 INJECTION, SOLUTION INTRAVENOUS at 12:07

## 2025-07-11 RX ADMIN — DILTIAZEM HYDROCHLORIDE 300 MG: 120 CAPSULE, COATED, EXTENDED RELEASE ORAL at 20:36

## 2025-07-11 RX ADMIN — DILTIAZEM HYDROCHLORIDE 20.5 MG: 5 INJECTION, SOLUTION INTRAVENOUS at 12:03

## 2025-07-11 RX ADMIN — ATORVASTATIN CALCIUM 80 MG: 80 TABLET, FILM COATED ORAL at 20:36

## 2025-07-11 RX ADMIN — IOPAMIDOL 75 ML: 755 INJECTION, SOLUTION INTRAVENOUS at 14:22

## 2025-07-11 ASSESSMENT — PAIN SCALES - GENERAL: PAINLEVEL_OUTOF10: 10

## 2025-07-11 ASSESSMENT — PAIN - FUNCTIONAL ASSESSMENT: PAIN_FUNCTIONAL_ASSESSMENT: 0-10

## 2025-07-11 NOTE — ED NOTES
ED to inpatient nurses report    Chief Complaint   Patient presents with    Leg Swelling    Shortness of Breath    Groin Swelling      Present to ED from home with c/o generalized edema, lower leg edema, scrotal swelling  LOC: alert and orientated to name, place, date  Vital signs   Vitals:    07/11/25 1444 07/11/25 1504 07/11/25 1514 07/11/25 1547   BP: (!) 124/98 (!) 130/99 (!) 113/95    Pulse: (!) 112 96 (!) 110 98   Resp:       Temp:       SpO2:    91%   Weight:          Oxygen Baseline room air    Current needs required 2L   LDAs:   Peripheral IV 07/11/25 Right Forearm (Active)   Site Assessment Clean, dry & intact 07/11/25 1138   Line Status Blood return noted 07/11/25 1138   Phlebitis Assessment No symptoms 07/11/25 1138   Infiltration Assessment 0 07/11/25 1138   Dressing Status New dressing applied 07/11/25 1138   Dressing Type Transparent 07/11/25 1138       Peripheral IV 07/11/25 Left Wrist (Active)   Site Assessment Clean, dry & intact 07/11/25 1218   Line Status Blood return noted 07/11/25 1218   Phlebitis Assessment No symptoms 07/11/25 1218   Infiltration Assessment 0 07/11/25 1218   Dressing Status New dressing applied 07/11/25 1218   Dressing Type Transparent 07/11/25 1218     Mobility: Independent  Pending ED orders: none, admitted.   Present condition: stable, alert, oriented.   Code Status: full code   Consults:  [x]  Hospitalist  Completed  [x] yes [] no  []  Medicine  Completed  [] yes [] No  []  Cardiology  Completed  [] yes [] No  []  GI   Completed  [] yes [] No  []  Neurology  Completed  [] yes [] No  []  Nephrology Completed  [] yes [] No  []  Vascular  Completed  [] yes [] No   []  Surgery  Completed  [] yes [] No   []  Urology  Completed  [] yes [] No   []  Plastics  Completed  [] yes [] No   []  ENT  Completed  [] yes [] No   []  Other   Completed  [] yes [] No  Pertinent event(s) cardizem drip  Pertinent event(s)   Electronically signed by PB ALEMAN RN on 7/11/2025 at 4:14 PM

## 2025-07-11 NOTE — ED PROVIDER NOTES
[facility]     Emergency Department     Faculty Attestation    I performed a history and physical examination of the patient and discussed management with the resident. I reviewed the resident's note and agree with the documented findings and plan of care. Any areas of disagreement are noted on the chart. I was personally present for the key portions of any procedures. I have documented in the chart those procedures where I was not present during the key portions. I have reviewed the emergency nurses triage note. I agree with the chief complaint, past medical history, past surgical history, allergies, medications, social and family history as documented unless otherwise noted below. For Physician Assistant/ Nurse Practitioner cases/documentation I have personally evaluated this patient and have completed at least one if not all key elements of the E/M (history, physical exam, and MDM). Additional findings are as noted.       EKG Interpretation    Interpreted by emergency department physician    Rhythm: Atrial fibrillation  Rate: 129  Axis: Right 94 degrees  Ectopy: none  Conduction: normal  ST Segments: no acute change  T Waves: no acute change  Q Waves: none  Poor R wave progression    Clinical Impression: Abnormal EKG    Riley Hanna, III     Riley Hanna MD  07/11/25 1158    Heart tachycardic and irregularly irregular, rales at the bases, significant bilateral lower extremity edema.     Riley Hanna MD  07/11/25 1205

## 2025-07-11 NOTE — ED NOTES
Pt to ed, ambulatory from home. Pt to ed with c/o generalized edema for the past several weeks. Pt has not had any medications for the past 2 months or so. He states he finally obtained his medications, took them for 5 days and is out again. Pt has generalized edema, testicle, penile swelling, states he has an inguinal hernia he never had repaired. Pt has 4+pitting edema to bilateral lower legs. Pt has sob, difficulty walking. Pt states abdominal discomfort due to the hernia. Pt also is a daily drinker, states he drinks 4-5 tall boys daily, he enjoyed one on his walk to the ER today. Pt also smokes marijuana and cocaine.pt states he last used yesterday. Pt is alert, oriented, speaking in full, complete sentences. Pt rates pain 10/10.

## 2025-07-11 NOTE — H&P
[facility]     Department of Internal Medicine - Staff Internal Medicine Teaching Service          ADMISSION NOTE/HISTORY AND PHYSICAL EXAMINATION   Date: 7/11/2025  Patient Name: Alberto Torres  Date of admission: 7/11/2025 11:20 AM  YOB: 1970  PCP: No primary care provider on file.  History Obtained From:  patient, electronic medical record    CHIEF COMPLAINT     Chief complaint: Shortness of breath, bilateral lower extremity extremity swelling    HISTORY OF PRESENTING ILLNESS     The patient is a pleasant 54 y.o. male with a past medical history of  Atrial fibrillation with rapid ventricular response   Left MCA stroke   presents with a chief complaint of bilateral leg swelling and shortness of breath.  This patient had not been using his regular medication for the past 3 months.  Started taking his medication 3 days ago.  Patient was not very interactive during interaction, but mention that he developed bilateral leg swelling up to the waist 5 days ago and also complained of shortness of breath.  Last echo done was in July 2024 with an estimated ejection fraction of 50 to 55%  CTPA done ruled out pulmonary embolism.  Findings consistent with right heart failure, bilateral pleural effusion, apical paraseptal emphysema and bibasilar atelectasis and some intralobular septal thickening.  Patient was drowsy and wanted to sleep.  Woke up, was able to communicate and got agitated because he was not able to use the urinal bottle correctly.  On my assessment patient had bilateral leg swelling up to the thighs, noted chest crackles on anterior chest examination on the left side and decreased breath sounds on the right basal segment.  Also noted mild tenderness in the right upper quadrant.  Patient was not very interactive and could not get a detailed history      Review of Systems   Respiratory:  Positive for shortness of breath. Negative for wheezing.    Cardiovascular:   of this automated transcription, some errors in transcription may have occurred.

## 2025-07-11 NOTE — ED NOTES
2 urinals at bedside.   Pt sleeping on cot, rr even and non labored, no distress noted.   Awaiting clean bed.

## 2025-07-12 PROBLEM — F19.10 POLYSUBSTANCE ABUSE (HCC): Status: ACTIVE | Noted: 2025-07-12

## 2025-07-12 PROBLEM — I50.9 CONGESTIVE HEART FAILURE (HCC): Status: ACTIVE | Noted: 2025-07-12

## 2025-07-12 PROBLEM — I10 PRIMARY HYPERTENSION: Status: ACTIVE | Noted: 2025-07-12

## 2025-07-12 LAB
ANION GAP SERPL CALCULATED.3IONS-SCNC: 14 MMOL/L (ref 9–16)
BASOPHILS # BLD: <0.03 K/UL (ref 0–0.2)
BASOPHILS NFR BLD: 0 % (ref 0–2)
BUN SERPL-MCNC: 12 MG/DL (ref 6–20)
CALCIUM SERPL-MCNC: 8.5 MG/DL (ref 8.6–10.4)
CHLORIDE SERPL-SCNC: 103 MMOL/L (ref 98–107)
CO2 SERPL-SCNC: 20 MMOL/L (ref 20–31)
CREAT SERPL-MCNC: 1.1 MG/DL (ref 0.7–1.2)
EKG ATRIAL RATE: 125 BPM
EKG Q-T INTERVAL: 320 MS
EKG QRS DURATION: 78 MS
EKG QTC CALCULATION (BAZETT): 468 MS
EKG R AXIS: 94 DEGREES
EKG T AXIS: 80 DEGREES
EKG VENTRICULAR RATE: 129 BPM
EOSINOPHIL # BLD: 0.05 K/UL (ref 0–0.44)
EOSINOPHILS RELATIVE PERCENT: 1 % (ref 1–4)
ERYTHROCYTE [DISTWIDTH] IN BLOOD BY AUTOMATED COUNT: 16.2 % (ref 11.8–14.4)
GFR, ESTIMATED: 80 ML/MIN/1.73M2
GLUCOSE SERPL-MCNC: 90 MG/DL (ref 74–99)
HCT VFR BLD AUTO: 47.1 % (ref 40.7–50.3)
HGB BLD-MCNC: 15.6 G/DL (ref 13–17)
IMM GRANULOCYTES # BLD AUTO: 0.04 K/UL (ref 0–0.3)
IMM GRANULOCYTES NFR BLD: 1 %
LYMPHOCYTES NFR BLD: 1.17 K/UL (ref 1.1–3.7)
LYMPHOCYTES RELATIVE PERCENT: 19 % (ref 24–43)
MCH RBC QN AUTO: 27.2 PG (ref 25.2–33.5)
MCHC RBC AUTO-ENTMCNC: 33.1 G/DL (ref 28.4–34.8)
MCV RBC AUTO: 82.1 FL (ref 82.6–102.9)
MONOCYTES NFR BLD: 0.44 K/UL (ref 0.1–1.2)
MONOCYTES NFR BLD: 7 % (ref 3–12)
NEUTROPHILS NFR BLD: 72 % (ref 36–65)
NEUTS SEG NFR BLD: 4.48 K/UL (ref 1.5–8.1)
NRBC BLD-RTO: 0 PER 100 WBC
PLATELET # BLD AUTO: ABNORMAL K/UL (ref 138–453)
PLATELET, FLUORESCENCE: ABNORMAL K/UL (ref 138–453)
POTASSIUM SERPL-SCNC: 4.4 MMOL/L (ref 3.7–5.3)
PROCALCITONIN SERPL-MCNC: 0.12 NG/ML (ref 0–0.09)
RBC # BLD AUTO: 5.74 M/UL (ref 4.21–5.77)
RBC # BLD: ABNORMAL 10*6/UL
SODIUM SERPL-SCNC: 137 MMOL/L (ref 136–145)
WBC OTHER # BLD: 6.2 K/UL (ref 3.5–11.3)

## 2025-07-12 PROCEDURE — 2500000003 HC RX 250 WO HCPCS

## 2025-07-12 PROCEDURE — 99223 1ST HOSP IP/OBS HIGH 75: CPT | Performed by: INTERNAL MEDICINE

## 2025-07-12 PROCEDURE — 6370000000 HC RX 637 (ALT 250 FOR IP)

## 2025-07-12 PROCEDURE — 6360000002 HC RX W HCPCS

## 2025-07-12 PROCEDURE — 93970 EXTREMITY STUDY: CPT | Performed by: STUDENT IN AN ORGANIZED HEALTH CARE EDUCATION/TRAINING PROGRAM

## 2025-07-12 PROCEDURE — 80048 BASIC METABOLIC PNL TOTAL CA: CPT

## 2025-07-12 PROCEDURE — 36415 COLL VENOUS BLD VENIPUNCTURE: CPT

## 2025-07-12 PROCEDURE — 93010 ELECTROCARDIOGRAM REPORT: CPT | Performed by: INTERNAL MEDICINE

## 2025-07-12 PROCEDURE — 85055 RETICULATED PLATELET ASSAY: CPT

## 2025-07-12 PROCEDURE — 2060000000 HC ICU INTERMEDIATE R&B

## 2025-07-12 PROCEDURE — 2580000003 HC RX 258

## 2025-07-12 PROCEDURE — 85025 COMPLETE CBC W/AUTO DIFF WBC: CPT

## 2025-07-12 PROCEDURE — 84145 PROCALCITONIN (PCT): CPT

## 2025-07-12 RX ORDER — METOPROLOL TARTRATE 1 MG/ML
5 INJECTION, SOLUTION INTRAVENOUS EVERY 5 MIN PRN
Status: DISCONTINUED | OUTPATIENT
Start: 2025-07-12 | End: 2025-07-18 | Stop reason: HOSPADM

## 2025-07-12 RX ORDER — FUROSEMIDE 10 MG/ML
20 INJECTION INTRAMUSCULAR; INTRAVENOUS DAILY
Status: DISCONTINUED | OUTPATIENT
Start: 2025-07-12 | End: 2025-07-13

## 2025-07-12 RX ADMIN — APIXABAN 5 MG: 5 TABLET, FILM COATED ORAL at 20:04

## 2025-07-12 RX ADMIN — ATORVASTATIN CALCIUM 80 MG: 80 TABLET, FILM COATED ORAL at 20:04

## 2025-07-12 RX ADMIN — CARVEDILOL 6.25 MG: 12.5 TABLET, FILM COATED ORAL at 20:04

## 2025-07-12 RX ADMIN — AMLODIPINE BESYLATE 5 MG: 10 TABLET ORAL at 08:12

## 2025-07-12 RX ADMIN — APIXABAN 5 MG: 5 TABLET, FILM COATED ORAL at 08:11

## 2025-07-12 RX ADMIN — DILTIAZEM HYDROCHLORIDE 300 MG: 120 CAPSULE, COATED, EXTENDED RELEASE ORAL at 08:11

## 2025-07-12 RX ADMIN — SODIUM CHLORIDE, PRESERVATIVE FREE 10 ML: 5 INJECTION INTRAVENOUS at 20:04

## 2025-07-12 RX ADMIN — SODIUM CHLORIDE, PRESERVATIVE FREE 10 ML: 5 INJECTION INTRAVENOUS at 08:13

## 2025-07-12 RX ADMIN — ASPIRIN 81 MG: 81 TABLET, CHEWABLE ORAL at 08:12

## 2025-07-12 RX ADMIN — CARVEDILOL 6.25 MG: 12.5 TABLET, FILM COATED ORAL at 08:10

## 2025-07-12 RX ADMIN — DILTIAZEM HYDROCHLORIDE 7.5 MG/HR: 5 INJECTION, SOLUTION INTRAVENOUS at 02:43

## 2025-07-12 RX ADMIN — FUROSEMIDE 20 MG: 10 INJECTION, SOLUTION INTRAMUSCULAR; INTRAVENOUS at 08:06

## 2025-07-12 NOTE — ED PROVIDER NOTES
[facility]  Emergency Department Encounter  Emergency Medicine Resident     Pt Name:Alberto Torres  MRN: [id number]  Birthdate 1970  Date of evaluation: 7/11/25  PCP:  No primary care provider on file.  Note Started: 9:44 PM EDT      CHIEF COMPLAINT       Chief Complaint   Patient presents with    Leg Swelling    Shortness of Breath    Groin Swelling       HISTORY OF PRESENT ILLNESS  (Location/Symptom, Timing/Onset, Context/Setting, Quality, Duration, Modifying Factors, Severity.)      Alberto Torres is a 54 y.o. male with past medical history of atrial fibrillation on Eliquis, HTN who presents with bilateral leg swelling that has been ongoing for 1 week and gradually worsening.  Patient reports that the swelling originally began in his ankles but now he feels as though he is swollen up to his waist.  He denies ever having similar symptoms in the past.  He reports some generalized tenderness and feels as though it is difficult to walk secondary to the leg swelling but denies any focal areas of pain.  Patient does report that he was out of his medications for a few months but recently began taking them again 5 days ago.  He reports some generalized shortness of breath, worse with exertion and lying flat.  He denies having any chest pain.  Patient does report that he is a daily drinker, drinks 4-5 tall boys daily.  He also uses marijuana and cocaine, which he last used yesterday.    PAST MEDICAL / SURGICAL / SOCIAL / FAMILY HISTORY      has a past medical history of Anxiety, Atrial fibrillation (HCC), Bipolar 1 disorder (HCC), Clinical trial participant, Depression, Hypertension, and Lactose intolerance.       has no past surgical history on file.      Social History     Socioeconomic History    Marital status: Single     Spouse name: Not on file    Number of children: Not on file    Years of education: Not on file    Highest education level: Not on file   Occupational History    Not on file   Tobacco Use

## 2025-07-12 NOTE — PLAN OF CARE
Problem: Chronic Conditions and Co-morbidities  Goal: Patient's chronic conditions and co-morbidity symptoms are monitored and maintained or improved  7/12/2025 0950 by Tray Stevenson RN  Outcome: Progressing  7/12/2025 0320 by Lynda De La Garza RN  Outcome: Progressing  Flowsheets (Taken 7/11/2025 2022)  Care Plan - Patient's Chronic Conditions and Co-Morbidity Symptoms are Monitored and Maintained or Improved: Monitor and assess patient's chronic conditions and comorbid symptoms for stability, deterioration, or improvement     Problem: Discharge Planning  Goal: Discharge to home or other facility with appropriate resources  7/12/2025 0950 by Tray Stevenson RN  Outcome: Progressing  7/12/2025 0320 by Lynda De La Garza RN  Outcome: Progressing  Flowsheets (Taken 7/11/2025 2022)  Discharge to home or other facility with appropriate resources:   Identify barriers to discharge with patient and caregiver   Arrange for needed discharge resources and transportation as appropriate     Problem: Pain  Goal: Verbalizes/displays adequate comfort level or baseline comfort level  7/12/2025 0950 by Tray Stevenson RN  Outcome: Progressing  7/12/2025 0320 by Lynda De La Garza RN  Outcome: Progressing     Problem: ABCDS Injury Assessment  Goal: Absence of physical injury  7/12/2025 0950 by Tray Stevenson RN  Outcome: Progressing  7/12/2025 0320 by Lynda De La Garza RN  Outcome: Progressing     Problem: Safety - Adult  Goal: Free from fall injury  7/12/2025 0950 by Tray Stevenson RN  Outcome: Progressing  7/12/2025 0320 by Lynda De La Garza RN  Outcome: Progressing

## 2025-07-12 NOTE — PROGRESS NOTES
[facility]  Internal Medicine Teaching Residency Program  Inpatient Daily Progress Note  ______________________________________________________________________________    Patient: Alberto Torres  YOB: 1970   MRN:[id number]    Acct: [id number]     Room: 0425/0425-01  Admit date: 7/11/2025  Today's date: 07/12/25  Number of days in the hospital: 1    SUBJECTIVE   Admitting Diagnosis: Atrial fibrillation with rapid ventricular response (HCC)  CC: Shortness of breath ,bilateral lower extremity edema    - Pt examined at bedside. Chart & results reviewed.   - Events overnight-no events overnight  -Patient reported improvement in his leg swelling with the Lasix  -Stop taking all meds regular medication for the past 2 months, started taking them 3 days ago.  Patient observed an acute onset of bilateral lower extremity edema and shortness of breath        Review of Systems   Constitutional:  Negative for activity change and appetite change.   HENT:  Negative for congestion.    Respiratory:  Positive for shortness of breath. Negative for cough, chest tightness and wheezing.    Cardiovascular:  Positive for leg swelling. Negative for chest pain.   Gastrointestinal:  Negative for abdominal distention and abdominal pain.   Musculoskeletal:  Negative for arthralgias.   Neurological:  Negative for light-headedness and headaches.   Psychiatric/Behavioral:  Negative for confusion.          BRIEF HISTORY     The patient is a pleasant 54 y.o. male with a past medical history of  Atrial fibrillation with rapid ventricular response   Left MCA stroke   presents with a chief complaint of bilateral leg swelling and shortness of breath.  This patient had not been using his regular medication for the past 3 months.  Started taking his medication 3 days ago.  Patient was not very interactive during interaction, but mention that he developed bilateral leg swelling up to the waist 5

## 2025-07-12 NOTE — PLAN OF CARE
Problem: Chronic Conditions and Co-morbidities  Goal: Patient's chronic conditions and co-morbidity symptoms are monitored and maintained or improved  Outcome: Progressing  Flowsheets (Taken 7/11/2025 2022)  Care Plan - Patient's Chronic Conditions and Co-Morbidity Symptoms are Monitored and Maintained or Improved: Monitor and assess patient's chronic conditions and comorbid symptoms for stability, deterioration, or improvement     Problem: Discharge Planning  Goal: Discharge to home or other facility with appropriate resources  Outcome: Progressing  Flowsheets (Taken 7/11/2025 2022)  Discharge to home or other facility with appropriate resources:   Identify barriers to discharge with patient and caregiver   Arrange for needed discharge resources and transportation as appropriate     Problem: Pain  Goal: Verbalizes/displays adequate comfort level or baseline comfort level  Outcome: Progressing     Problem: ABCDS Injury Assessment  Goal: Absence of physical injury  Outcome: Progressing     Problem: Safety - Adult  Goal: Free from fall injury  Outcome: Progressing

## 2025-07-13 ENCOUNTER — APPOINTMENT (OUTPATIENT)
Dept: GENERAL RADIOLOGY | Age: 55
End: 2025-07-13
Payer: COMMERCIAL

## 2025-07-13 PROBLEM — I48.0 PAROXYSMAL ATRIAL FIBRILLATION (HCC): Status: ACTIVE | Noted: 2025-07-13

## 2025-07-13 LAB
ANION GAP SERPL CALCULATED.3IONS-SCNC: 10 MMOL/L (ref 9–16)
BASOPHILS # BLD: <0.03 K/UL (ref 0–0.2)
BASOPHILS NFR BLD: 0 % (ref 0–2)
BNP SERPL-MCNC: 3015 PG/ML (ref 0–125)
BUN SERPL-MCNC: 16 MG/DL (ref 6–20)
CALCIUM SERPL-MCNC: 8.5 MG/DL (ref 8.6–10.4)
CHLORIDE SERPL-SCNC: 102 MMOL/L (ref 98–107)
CO2 SERPL-SCNC: 27 MMOL/L (ref 20–31)
CREAT SERPL-MCNC: 1.2 MG/DL (ref 0.7–1.2)
EOSINOPHIL # BLD: 0.08 K/UL (ref 0–0.44)
EOSINOPHILS RELATIVE PERCENT: 1 % (ref 1–4)
ERYTHROCYTE [DISTWIDTH] IN BLOOD BY AUTOMATED COUNT: 15.9 % (ref 11.8–14.4)
GFR, ESTIMATED: 72 ML/MIN/1.73M2
GLUCOSE SERPL-MCNC: 155 MG/DL (ref 74–99)
HCT VFR BLD AUTO: 46.1 % (ref 40.7–50.3)
HGB BLD-MCNC: 15 G/DL (ref 13–17)
IMM GRANULOCYTES # BLD AUTO: <0.03 K/UL (ref 0–0.3)
IMM GRANULOCYTES NFR BLD: 0 %
LYMPHOCYTES NFR BLD: 1.08 K/UL (ref 1.1–3.7)
LYMPHOCYTES RELATIVE PERCENT: 15 % (ref 24–43)
MAGNESIUM SERPL-MCNC: 2.1 MG/DL (ref 1.6–2.6)
MCH RBC QN AUTO: 27.2 PG (ref 25.2–33.5)
MCHC RBC AUTO-ENTMCNC: 32.5 G/DL (ref 28.4–34.8)
MCV RBC AUTO: 83.7 FL (ref 82.6–102.9)
MONOCYTES NFR BLD: 0.44 K/UL (ref 0.1–1.2)
MONOCYTES NFR BLD: 6 % (ref 3–12)
NEUTROPHILS NFR BLD: 78 % (ref 36–65)
NEUTS SEG NFR BLD: 5.62 K/UL (ref 1.5–8.1)
NRBC BLD-RTO: 0 PER 100 WBC
PLATELET # BLD AUTO: 301 K/UL (ref 138–453)
PMV BLD AUTO: 9.3 FL (ref 8.1–13.5)
POTASSIUM SERPL-SCNC: 4.2 MMOL/L (ref 3.7–5.3)
RBC # BLD AUTO: 5.51 M/UL (ref 4.21–5.77)
RBC # BLD: ABNORMAL 10*6/UL
SODIUM SERPL-SCNC: 139 MMOL/L (ref 136–145)
WBC OTHER # BLD: 7.3 K/UL (ref 3.5–11.3)

## 2025-07-13 PROCEDURE — 71045 X-RAY EXAM CHEST 1 VIEW: CPT

## 2025-07-13 PROCEDURE — 36415 COLL VENOUS BLD VENIPUNCTURE: CPT

## 2025-07-13 PROCEDURE — 83735 ASSAY OF MAGNESIUM: CPT

## 2025-07-13 PROCEDURE — 93005 ELECTROCARDIOGRAM TRACING: CPT

## 2025-07-13 PROCEDURE — 85025 COMPLETE CBC W/AUTO DIFF WBC: CPT

## 2025-07-13 PROCEDURE — 99223 1ST HOSP IP/OBS HIGH 75: CPT | Performed by: INTERNAL MEDICINE

## 2025-07-13 PROCEDURE — 6370000000 HC RX 637 (ALT 250 FOR IP)

## 2025-07-13 PROCEDURE — 83880 ASSAY OF NATRIURETIC PEPTIDE: CPT

## 2025-07-13 PROCEDURE — 6360000002 HC RX W HCPCS: Performed by: STUDENT IN AN ORGANIZED HEALTH CARE EDUCATION/TRAINING PROGRAM

## 2025-07-13 PROCEDURE — 2500000003 HC RX 250 WO HCPCS

## 2025-07-13 PROCEDURE — 80048 BASIC METABOLIC PNL TOTAL CA: CPT

## 2025-07-13 PROCEDURE — 99233 SBSQ HOSP IP/OBS HIGH 50: CPT | Performed by: INTERNAL MEDICINE

## 2025-07-13 PROCEDURE — 2060000000 HC ICU INTERMEDIATE R&B

## 2025-07-13 PROCEDURE — 6360000002 HC RX W HCPCS

## 2025-07-13 RX ORDER — MAGNESIUM SULFATE IN WATER 40 MG/ML
2000 INJECTION, SOLUTION INTRAVENOUS ONCE
Status: DISCONTINUED | OUTPATIENT
Start: 2025-07-13 | End: 2025-07-18 | Stop reason: HOSPADM

## 2025-07-13 RX ORDER — FUROSEMIDE 10 MG/ML
40 INJECTION INTRAMUSCULAR; INTRAVENOUS DAILY
Status: DISCONTINUED | OUTPATIENT
Start: 2025-07-13 | End: 2025-07-14

## 2025-07-13 RX ORDER — METOPROLOL TARTRATE 1 MG/ML
5 INJECTION, SOLUTION INTRAVENOUS EVERY 6 HOURS PRN
Status: DISCONTINUED | OUTPATIENT
Start: 2025-07-13 | End: 2025-07-18 | Stop reason: HOSPADM

## 2025-07-13 RX ORDER — METOPROLOL SUCCINATE 25 MG/1
25 TABLET, EXTENDED RELEASE ORAL DAILY
Status: DISCONTINUED | OUTPATIENT
Start: 2025-07-13 | End: 2025-07-14

## 2025-07-13 RX ORDER — METOPROLOL TARTRATE 1 MG/ML
5 INJECTION, SOLUTION INTRAVENOUS EVERY 6 HOURS
Status: DISCONTINUED | OUTPATIENT
Start: 2025-07-13 | End: 2025-07-13

## 2025-07-13 RX ADMIN — ASPIRIN 81 MG: 81 TABLET, CHEWABLE ORAL at 08:36

## 2025-07-13 RX ADMIN — FUROSEMIDE 40 MG: 10 INJECTION, SOLUTION INTRAMUSCULAR; INTRAVENOUS at 08:39

## 2025-07-13 RX ADMIN — DILTIAZEM HYDROCHLORIDE 300 MG: 120 CAPSULE, COATED, EXTENDED RELEASE ORAL at 08:36

## 2025-07-13 RX ADMIN — APIXABAN 5 MG: 5 TABLET, FILM COATED ORAL at 20:55

## 2025-07-13 RX ADMIN — AMLODIPINE BESYLATE 5 MG: 10 TABLET ORAL at 08:36

## 2025-07-13 RX ADMIN — METOPROLOL TARTRATE 5 MG: 1 INJECTION, SOLUTION INTRAVENOUS at 21:00

## 2025-07-13 RX ADMIN — METOPROLOL TARTRATE 5 MG: 1 INJECTION, SOLUTION INTRAVENOUS at 08:50

## 2025-07-13 RX ADMIN — APIXABAN 5 MG: 5 TABLET, FILM COATED ORAL at 08:36

## 2025-07-13 RX ADMIN — SODIUM CHLORIDE, PRESERVATIVE FREE 10 ML: 5 INJECTION INTRAVENOUS at 08:35

## 2025-07-13 RX ADMIN — METOPROLOL SUCCINATE 25 MG: 25 TABLET, EXTENDED RELEASE ORAL at 10:36

## 2025-07-13 RX ADMIN — SODIUM CHLORIDE, PRESERVATIVE FREE 10 ML: 5 INJECTION INTRAVENOUS at 21:01

## 2025-07-13 RX ADMIN — ATORVASTATIN CALCIUM 80 MG: 80 TABLET, FILM COATED ORAL at 20:55

## 2025-07-13 RX ADMIN — MAGNESIUM SULFATE HEPTAHYDRATE 2000 MG: 40 INJECTION, SOLUTION INTRAVENOUS at 02:56

## 2025-07-13 RX ADMIN — ACETAMINOPHEN 650 MG: 325 TABLET ORAL at 21:00

## 2025-07-13 ASSESSMENT — PAIN DESCRIPTION - PAIN TYPE: TYPE: ACUTE PAIN

## 2025-07-13 ASSESSMENT — PAIN DESCRIPTION - LOCATION: LOCATION: LEG

## 2025-07-13 ASSESSMENT — PAIN - FUNCTIONAL ASSESSMENT: PAIN_FUNCTIONAL_ASSESSMENT: ACTIVITIES ARE NOT PREVENTED

## 2025-07-13 ASSESSMENT — PAIN DESCRIPTION - DESCRIPTORS: DESCRIPTORS: ACHING;DISCOMFORT;SORE

## 2025-07-13 ASSESSMENT — PAIN SCALES - GENERAL: PAINLEVEL_OUTOF10: 3

## 2025-07-13 ASSESSMENT — PAIN DESCRIPTION - ORIENTATION: ORIENTATION: RIGHT;LEFT

## 2025-07-13 NOTE — PROGRESS NOTES
[facility]  Internal Medicine Teaching Residency Program  Inpatient Daily Progress Note  ______________________________________________________________________________    Patient: Alberto Torres  YOB: 1970   MRN:[id number]    Acct: [id number]     Room: 0425/0425-01  Admit date: 7/11/2025  Today's date: 07/13/25  Number of days in the hospital: 2    SUBJECTIVE   Admitting Diagnosis: Atrial fibrillation with rapid ventricular response (HCC)  CC: Shortness of breath ,bilateral lower extremity edema    - Pt examined at bedside. Chart & results reviewed.   - Events overnight-patient had V. tach on the monitor, EKG done showed atrial fibrillation  -Patient had previous history of cardioversion and ablation done in the past twice.  -Initially on Cardizem drip while in the hospital and now switched to oral Cardizem.  For rate control patient on Lopressor  -Patient reported some shortness of breath waking up this morning and was started on nasal cannula 2 L.    Appreciate cardiology consult.  Advised to continue diuresis, obtain echo, achieve rate control with Cardizem and Coreg and add Lopressor if further rate control is required, continue Eliquis.      - Stopped taking all meds regular medication for the past 2 months, started taking them 3 days ago.          Review of Systems   Constitutional:  Negative for activity change and appetite change.   HENT:  Negative for congestion.    Respiratory:  Positive for shortness of breath. Negative for cough, chest tightness and wheezing.    Cardiovascular:  Positive for leg swelling. Negative for chest pain.   Gastrointestinal:  Negative for abdominal distention and abdominal pain.   Musculoskeletal:  Negative for arthralgias.   Neurological:  Negative for light-headedness and headaches.   Psychiatric/Behavioral:  Negative for confusion.          BRIEF HISTORY     The patient is a pleasant 54 y.o. male with a past medical

## 2025-07-13 NOTE — CONSULTS
Attestation signed by      Attending Physician Statement:    I have discussed the care of  Alberto Torres , including pertinent history and exam findings, with the Cardiology fellow/resident.     I have seen and examined the patient and the key elements of all parts of the encounter have been performed by me. I agree with the assessment, plan and orders as documented by the fellow/resident, after I modified exam findings and plan of treatments, and the final version is my approved version of the assessment.     Additional Comments:   Acute CHF with preserved EF. IV diuresis. Increase Lasix to 40 mg IV BID. Close follow on I/O and BMP.  Echo.   Permanent atrial fibrillation. Afib with RVR, will change Coreg to Lopressor. Titrate up as needed. On Eliquis.   Encourage medication Compliance.   Will follow.            [facility]    Consult / H&P               Today's Date: 7/13/2025  Patient Name: Alberto Torres  Date of admission: 7/11/2025 11:20 AM  Patient's age: 54 y.o., 1970  Admission Dx: Shortness of breath [R06.02]  Paroxysmal atrial fibrillation (HCC) [I48.0]  Bilateral leg edema [R60.0]  Congestive heart failure, unspecified HF chronicity, unspecified heart failure type (HCC) [I50.9]  Acute exacerbation of chronic heart failure (HCC) [I50.9]    Reason for Consult: Atrial fibrillation with rapid ventricular rate    Requesting Physician: Camilo Oconnor MD    CHIEF COMPLAINT: Shortness of breath, bilateral lower extremity edema.    History Obtained From:  patient, electronic medical record    HISTORY OF PRESENT ILLNESS:      The patient is a 54 y.o. male with past medical history of permanent atrial fibrillation on Eliquis and Cardizem, hypertension, history of MCA stroke, history of emphysema with left-sided pleurodesis, congestive heart failure, anxiety and polysubstance abuse.  He presented to the ED with shortness of breath, severe bilateral lower extremity edema and fluid in the scrotum    ALT 43     CTA chest (pulmonary embolism): 7/11/2025  1. No evidence of large central pulmonary emboli. Assessment for small  nonocclusive intraluminal filling defects to more distal segmental or  subsegmental branches, especially to the lower lobes, markedly limited on  this study, as above.  2. Findings consistent with right heart failure, including 3 chamber  enlargement, especially right atrium, left atrium, and left ventricle.  3. Bilateral pleural effusions, as above.  4. Apical paraseptal emphysema and bibasilar atelectasis, latter worse on the  left.  5. Some interlobular septal thickening, best seen in the upper lung zones,  likely related to mild interstitial edema.    Chest x-ray: 7/11/2025  Mild cardiomegaly.  Pulmonary vascular congestion.  Pulmonary edema.  No  focal pulmonary consolidation.  No pneumothorax.    Bilateral lower extremity duplex scan: 7/11/2025  Right Lower Venous    No evidence of deep vein or superficial vein thrombosis. The common femoral, saphenofemoral junction, femoral, popliteal, posterior tibial, greater saphenous, and small saphenous veins were imaged in the transverse view and showed normal compressibility. The common femoral, popliteal, and femoral veins were imaged in the longitudinal view and showed normal color filling and normal phasic and spontaneous flow.   Left Lower Venous    No evidence of deep vein or superficial vein thrombosis. The common femoral, saphenofemoral junction, femoral, popliteal, posterior tibial, greater saphenous, and small saphenous veins were imaged in the transverse view and showed normal compressibility. The common femoral, popliteal, and femoral veins were imaged in the longitudinal view and showed normal color filling and normal phasic and spontaneous flow.     IMPRESSION:    Permanent atrial fibrillation with rapid ventricular response on Eliquis and Cardizem.  With a history of ablation for atrial flutter in 2023  Acute congestive heart

## 2025-07-13 NOTE — PLAN OF CARE
Problem: Chronic Conditions and Co-morbidities  Goal: Patient's chronic conditions and co-morbidity symptoms are monitored and maintained or improved  Outcome: Progressing  Flowsheets (Taken 7/12/2025 1917)  Care Plan - Patient's Chronic Conditions and Co-Morbidity Symptoms are Monitored and Maintained or Improved: Monitor and assess patient's chronic conditions and comorbid symptoms for stability, deterioration, or improvement     Problem: Discharge Planning  Goal: Discharge to home or other facility with appropriate resources  Outcome: Progressing  Flowsheets (Taken 7/12/2025 1917)  Discharge to home or other facility with appropriate resources: Identify barriers to discharge with patient and caregiver     Problem: Pain  Goal: Verbalizes/displays adequate comfort level or baseline comfort level  Outcome: Progressing     Problem: ABCDS Injury Assessment  Goal: Absence of physical injury  Outcome: Progressing     Problem: Safety - Adult  Goal: Free from fall injury  Outcome: Progressing

## 2025-07-13 NOTE — PLAN OF CARE
Problem: Chronic Conditions and Co-morbidities  Goal: Patient's chronic conditions and co-morbidity symptoms are monitored and maintained or improved  7/13/2025 0926 by Tray Stevenson RN  Outcome: Progressing  7/13/2025 0337 by Lynda De La Garza RN  Outcome: Progressing  Flowsheets (Taken 7/12/2025 1917)  Care Plan - Patient's Chronic Conditions and Co-Morbidity Symptoms are Monitored and Maintained or Improved: Monitor and assess patient's chronic conditions and comorbid symptoms for stability, deterioration, or improvement     Problem: Discharge Planning  Goal: Discharge to home or other facility with appropriate resources  7/13/2025 0926 by Tray Stevenson RN  Outcome: Progressing  7/13/2025 0337 by Lynda De La Garza RN  Outcome: Progressing  Flowsheets (Taken 7/12/2025 1917)  Discharge to home or other facility with appropriate resources: Identify barriers to discharge with patient and caregiver     Problem: Pain  Goal: Verbalizes/displays adequate comfort level or baseline comfort level  7/13/2025 0926 by Tray Stevenson RN  Outcome: Progressing  7/13/2025 0337 by Lynda De La Garza RN  Outcome: Progressing     Problem: ABCDS Injury Assessment  Goal: Absence of physical injury  7/13/2025 0926 by Tray Stevenson RN  Outcome: Progressing  7/13/2025 0337 by Lynda De La Garza RN  Outcome: Progressing     Problem: Safety - Adult  Goal: Free from fall injury  7/13/2025 0926 by Tray Stevenson RN  Outcome: Progressing  7/13/2025 0337 by Lynda De La Garza RN  Outcome: Progressing

## 2025-07-14 ENCOUNTER — APPOINTMENT (OUTPATIENT)
Age: 55
End: 2025-07-14
Payer: COMMERCIAL

## 2025-07-14 LAB
ANION GAP SERPL CALCULATED.3IONS-SCNC: 8 MMOL/L (ref 9–16)
BASOPHILS # BLD: <0.03 K/UL (ref 0–0.2)
BASOPHILS NFR BLD: 0 % (ref 0–2)
BUN SERPL-MCNC: 15 MG/DL (ref 6–20)
CALCIUM SERPL-MCNC: 8.5 MG/DL (ref 8.6–10.4)
CHLORIDE SERPL-SCNC: 104 MMOL/L (ref 98–107)
CO2 SERPL-SCNC: 27 MMOL/L (ref 20–31)
CREAT SERPL-MCNC: 1.1 MG/DL (ref 0.7–1.2)
ECHO AO ASC DIAM: 2.5 CM
ECHO AO ASCENDING AORTA INDEX: 1.24 CM/M2
ECHO AO ROOT DIAM: 2.7 CM
ECHO AO ROOT INDEX: 1.34 CM/M2
ECHO AV AREA PEAK VELOCITY: 2.2 CM2
ECHO AV AREA VTI: 1.8 CM2
ECHO AV AREA/BSA PEAK VELOCITY: 1.1 CM2/M2
ECHO AV AREA/BSA VTI: 0.9 CM2/M2
ECHO AV MEAN GRADIENT: 2 MMHG
ECHO AV MEAN VELOCITY: 0.6 M/S
ECHO AV PEAK GRADIENT: 3 MMHG
ECHO AV PEAK VELOCITY: 0.9 M/S
ECHO AV VELOCITY RATIO: 0.67
ECHO AV VTI: 15.1 CM
ECHO BSA: 2.02 M2
ECHO EST RA PRESSURE: 8 MMHG
ECHO LA AREA 2C: 37.4 CM2
ECHO LA AREA 4C: 30.6 CM2
ECHO LA DIAMETER INDEX: 2.67 CM/M2
ECHO LA DIAMETER: 5.4 CM
ECHO LA MAJOR AXIS: 6.6 CM
ECHO LA MINOR AXIS: 6.7 CM
ECHO LA TO AORTIC ROOT RATIO: 2
ECHO LA VOL BP: 135 ML (ref 18–58)
ECHO LA VOL MOD A2C: 164 ML (ref 18–58)
ECHO LA VOL MOD A4C: 112 ML (ref 18–58)
ECHO LA VOL/BSA BIPLANE: 67 ML/M2 (ref 16–34)
ECHO LA VOLUME INDEX MOD A2C: 81 ML/M2 (ref 16–34)
ECHO LA VOLUME INDEX MOD A4C: 55 ML/M2 (ref 16–34)
ECHO LV E' LATERAL VELOCITY: 10.7 CM/S
ECHO LV E' SEPTAL VELOCITY: 10 CM/S
ECHO LV EDV A2C: 110 ML
ECHO LV EDV A4C: 113 ML
ECHO LV EDV INDEX A4C: 56 ML/M2
ECHO LV EDV NDEX A2C: 54 ML/M2
ECHO LV EF PHYSICIAN: 34 %
ECHO LV EJECTION FRACTION A2C: 22 %
ECHO LV EJECTION FRACTION A4C: 33 %
ECHO LV EJECTION FRACTION BIPLANE: 34 % (ref 55–100)
ECHO LV ESV A2C: 86 ML
ECHO LV ESV A4C: 76 ML
ECHO LV ESV INDEX A2C: 43 ML/M2
ECHO LV ESV INDEX A4C: 38 ML/M2
ECHO LV FRACTIONAL SHORTENING: 15 % (ref 28–44)
ECHO LV INTERNAL DIMENSION DIASTOLE INDEX: 1.98 CM/M2
ECHO LV INTERNAL DIMENSION DIASTOLIC: 4 CM (ref 4.2–5.9)
ECHO LV INTERNAL DIMENSION SYSTOLIC INDEX: 1.68 CM/M2
ECHO LV INTERNAL DIMENSION SYSTOLIC: 3.4 CM
ECHO LV IVSD: 1.5 CM (ref 0.6–1)
ECHO LV MASS 2D: 220.7 G (ref 88–224)
ECHO LV MASS INDEX 2D: 109.2 G/M2 (ref 49–115)
ECHO LV POSTERIOR WALL DIASTOLIC: 1.4 CM (ref 0.6–1)
ECHO LV RELATIVE WALL THICKNESS RATIO: 0.7
ECHO LVOT AREA: 3.5 CM2
ECHO LVOT AV VTI INDEX: 0.5
ECHO LVOT DIAM: 2.1 CM
ECHO LVOT MEAN GRADIENT: 1 MMHG
ECHO LVOT PEAK GRADIENT: 1 MMHG
ECHO LVOT PEAK VELOCITY: 0.6 M/S
ECHO LVOT STROKE VOLUME INDEX: 13 ML/M2
ECHO LVOT SV: 26.3 ML
ECHO LVOT VTI: 7.6 CM
ECHO MV A VELOCITY: 0.25 M/S
ECHO MV AREA VTI: 1.5 CM2
ECHO MV E DECELERATION TIME (DT): 145 MS
ECHO MV E VELOCITY: 1.37 M/S
ECHO MV E/A RATIO: 5.48
ECHO MV E/E' LATERAL: 12.8
ECHO MV E/E' RATIO (AVERAGED): 13.25
ECHO MV E/E' SEPTAL: 13.7
ECHO MV LVOT VTI INDEX: 2.33
ECHO MV MAX VELOCITY: 1.5 M/S
ECHO MV MEAN GRADIENT: 4 MMHG
ECHO MV MEAN VELOCITY: 0.9 M/S
ECHO MV PEAK GRADIENT: 9 MMHG
ECHO MV REGURGITANT ALIASING (NYQUIST) VELOCITY: 31 CM/S
ECHO MV REGURGITANT PEAK GRADIENT: 125 MMHG
ECHO MV REGURGITANT PEAK VELOCITY: 5.6 M/S
ECHO MV REGURGITANT RADIUS PISA: 1.3 CM
ECHO MV REGURGITANT VTIA: 153 CM
ECHO MV VTI: 17.7 CM
ECHO PV MAX VELOCITY: 0.8 M/S
ECHO PV PEAK GRADIENT: 3 MMHG
ECHO RA AREA 4C: 38.9 CM2
ECHO RA END SYSTOLIC VOLUME APICAL 4 CHAMBER INDEX BSA: 82 ML/M2
ECHO RA VOLUME: 166 ML
ECHO RIGHT VENTRICULAR SYSTOLIC PRESSURE (RVSP): 47 MMHG
ECHO RV BASAL DIMENSION: 4.3 CM
ECHO RV FREE WALL PEAK S': 9 CM/S
ECHO RV TAPSE: 1.3 CM (ref 1.7–?)
ECHO TV REGURGITANT MAX VELOCITY: 3.13 M/S
ECHO TV REGURGITANT PEAK GRADIENT: 39 MMHG
EKG ATRIAL RATE: 234 BPM
EKG Q-T INTERVAL: 406 MS
EKG QRS DURATION: 80 MS
EKG QTC CALCULATION (BAZETT): 488 MS
EKG R AXIS: 94 DEGREES
EKG T AXIS: 86 DEGREES
EKG VENTRICULAR RATE: 87 BPM
EOSINOPHIL # BLD: 0.08 K/UL (ref 0–0.44)
EOSINOPHILS RELATIVE PERCENT: 1 % (ref 1–4)
ERYTHROCYTE [DISTWIDTH] IN BLOOD BY AUTOMATED COUNT: 15.9 % (ref 11.8–14.4)
GFR, ESTIMATED: 80 ML/MIN/1.73M2
GLUCOSE SERPL-MCNC: 102 MG/DL (ref 74–99)
HCT VFR BLD AUTO: 46 % (ref 40.7–50.3)
HGB BLD-MCNC: 15 G/DL (ref 13–17)
IMM GRANULOCYTES # BLD AUTO: 0.03 K/UL (ref 0–0.3)
IMM GRANULOCYTES NFR BLD: 0 %
LYMPHOCYTES NFR BLD: 1.56 K/UL (ref 1.1–3.7)
LYMPHOCYTES RELATIVE PERCENT: 23 % (ref 24–43)
MCH RBC QN AUTO: 27.3 PG (ref 25.2–33.5)
MCHC RBC AUTO-ENTMCNC: 32.6 G/DL (ref 28.4–34.8)
MCV RBC AUTO: 83.8 FL (ref 82.6–102.9)
MONOCYTES NFR BLD: 0.44 K/UL (ref 0.1–1.2)
MONOCYTES NFR BLD: 7 % (ref 3–12)
NEUTROPHILS NFR BLD: 69 % (ref 36–65)
NEUTS SEG NFR BLD: 4.57 K/UL (ref 1.5–8.1)
NRBC BLD-RTO: 0 PER 100 WBC
PLATELET # BLD AUTO: 346 K/UL (ref 138–453)
PMV BLD AUTO: 9.8 FL (ref 8.1–13.5)
POTASSIUM SERPL-SCNC: 4 MMOL/L (ref 3.7–5.3)
RBC # BLD AUTO: 5.49 M/UL (ref 4.21–5.77)
RBC # BLD: ABNORMAL 10*6/UL
SODIUM SERPL-SCNC: 139 MMOL/L (ref 136–145)
WBC OTHER # BLD: 6.7 K/UL (ref 3.5–11.3)

## 2025-07-14 PROCEDURE — 6370000000 HC RX 637 (ALT 250 FOR IP)

## 2025-07-14 PROCEDURE — 85025 COMPLETE CBC W/AUTO DIFF WBC: CPT

## 2025-07-14 PROCEDURE — 6360000004 HC RX CONTRAST MEDICATION

## 2025-07-14 PROCEDURE — 2060000000 HC ICU INTERMEDIATE R&B

## 2025-07-14 PROCEDURE — 6360000002 HC RX W HCPCS

## 2025-07-14 PROCEDURE — 36415 COLL VENOUS BLD VENIPUNCTURE: CPT

## 2025-07-14 PROCEDURE — 99232 SBSQ HOSP IP/OBS MODERATE 35: CPT | Performed by: INTERNAL MEDICINE

## 2025-07-14 PROCEDURE — 99233 SBSQ HOSP IP/OBS HIGH 50: CPT | Performed by: NURSE PRACTITIONER

## 2025-07-14 PROCEDURE — 6370000000 HC RX 637 (ALT 250 FOR IP): Performed by: NURSE PRACTITIONER

## 2025-07-14 PROCEDURE — 2500000003 HC RX 250 WO HCPCS

## 2025-07-14 PROCEDURE — 80048 BASIC METABOLIC PNL TOTAL CA: CPT

## 2025-07-14 PROCEDURE — 6360000002 HC RX W HCPCS: Performed by: STUDENT IN AN ORGANIZED HEALTH CARE EDUCATION/TRAINING PROGRAM

## 2025-07-14 PROCEDURE — C8929 TTE W OR WO FOL WCON,DOPPLER: HCPCS

## 2025-07-14 RX ORDER — METOPROLOL TARTRATE 25 MG/1
25 TABLET, FILM COATED ORAL 2 TIMES DAILY
Status: DISCONTINUED | OUTPATIENT
Start: 2025-07-14 | End: 2025-07-15

## 2025-07-14 RX ORDER — FUROSEMIDE 10 MG/ML
40 INJECTION INTRAMUSCULAR; INTRAVENOUS 2 TIMES DAILY
Status: DISCONTINUED | OUTPATIENT
Start: 2025-07-14 | End: 2025-07-18 | Stop reason: HOSPADM

## 2025-07-14 RX ORDER — METOPROLOL SUCCINATE 25 MG/1
25 TABLET, EXTENDED RELEASE ORAL ONCE
Status: COMPLETED | OUTPATIENT
Start: 2025-07-14 | End: 2025-07-14

## 2025-07-14 RX ADMIN — AMLODIPINE BESYLATE 5 MG: 10 TABLET ORAL at 07:43

## 2025-07-14 RX ADMIN — APIXABAN 5 MG: 5 TABLET, FILM COATED ORAL at 07:43

## 2025-07-14 RX ADMIN — METOPROLOL SUCCINATE 25 MG: 25 TABLET, EXTENDED RELEASE ORAL at 10:34

## 2025-07-14 RX ADMIN — SODIUM CHLORIDE, PRESERVATIVE FREE 10 ML: 5 INJECTION INTRAVENOUS at 21:19

## 2025-07-14 RX ADMIN — ACETAMINOPHEN 650 MG: 325 TABLET ORAL at 21:14

## 2025-07-14 RX ADMIN — APIXABAN 5 MG: 5 TABLET, FILM COATED ORAL at 21:14

## 2025-07-14 RX ADMIN — SULFUR HEXAFLUORIDE 2 ML: KIT at 09:19

## 2025-07-14 RX ADMIN — DILTIAZEM HYDROCHLORIDE 300 MG: 120 CAPSULE, COATED, EXTENDED RELEASE ORAL at 07:43

## 2025-07-14 RX ADMIN — ASPIRIN 81 MG: 81 TABLET, CHEWABLE ORAL at 07:43

## 2025-07-14 RX ADMIN — ATORVASTATIN CALCIUM 80 MG: 80 TABLET, FILM COATED ORAL at 21:15

## 2025-07-14 RX ADMIN — METOPROLOL SUCCINATE 25 MG: 25 TABLET, EXTENDED RELEASE ORAL at 07:43

## 2025-07-14 RX ADMIN — SODIUM CHLORIDE, PRESERVATIVE FREE 10 ML: 5 INJECTION INTRAVENOUS at 07:45

## 2025-07-14 RX ADMIN — FUROSEMIDE 40 MG: 10 INJECTION, SOLUTION INTRAMUSCULAR; INTRAVENOUS at 18:19

## 2025-07-14 RX ADMIN — METOPROLOL TARTRATE 25 MG: 25 TABLET, FILM COATED ORAL at 21:15

## 2025-07-14 RX ADMIN — FUROSEMIDE 40 MG: 10 INJECTION, SOLUTION INTRAMUSCULAR; INTRAVENOUS at 07:45

## 2025-07-14 ASSESSMENT — PAIN SCALES - GENERAL: PAINLEVEL_OUTOF10: 5

## 2025-07-14 ASSESSMENT — PAIN DESCRIPTION - LOCATION: LOCATION: HEAD

## 2025-07-14 NOTE — CARE COORDINATION
Alberto Torres  [id number]  meets criteria for hypertension education. The following resources and interventions were provided: Hypertension Flyer education. States he has a PCP and cardiologist and has seen them within the last year.

## 2025-07-14 NOTE — CARE COORDINATION
Case Management Assessment  Initial Evaluation    Date/Time of Evaluation: 7/14/2025 3:22 PM  Assessment Completed by: Jennifer Gross RN    If patient is discharged prior to next notation, then this note serves as note for discharge by case management.    Patient Name: Alberto Torres                   YOB: 1970  Diagnosis: Shortness of breath [R06.02]  Paroxysmal atrial fibrillation (HCC) [I48.0]  Bilateral leg edema [R60.0]  Congestive heart failure, unspecified HF chronicity, unspecified heart failure type (HCC) [I50.9]  Acute exacerbation of chronic heart failure (HCC) [I50.9]                   Date / Time: 7/11/2025 11:20 AM    Patient Admission Status: Inpatient   Readmission Risk (Low < 19, Mod (19-27), High > 27): Readmission Risk Score: 10.8    Current PCP: No primary care provider on file.  PCP verified by CM? (P) Yes (Delores T..? at [facility])    Chart Reviewed: Yes      History Provided by: (P) Patient  Patient Orientation: (P) Alert and Oriented, Person, Place, Situation, Self    Patient Cognition: (P) Alert    Hospitalization in the last 30 days (Readmission):  No    If yes, Readmission Assessment in CM Navigator will be completed.    Advance Directives:      Code Status: Full Code   Patient's Primary Decision Maker is:        Discharge Planning:    Patient lives with: (P) Spouse/Significant Other (maryWhite) Type of Home: (P) Apartment  Primary Care Giver: (P) Self  Patient Support Systems include: (P) Other (Comment) (Brett Meehan and his sister)   Current Financial resources: (P) Medicaid  Current community resources: (P) None  Current services prior to admission: (P) None            Current DME:              Type of Home Care services:  (P) None    ADLS  Prior functional level:    Current functional level: (P) Assistance with the following:, Mobility    PT AM-PAC:   /24  OT AM-PAC:   /24    Family can provide assistance at DC: (P) Other (comment) (lives with brett meehan)  Would you like Case

## 2025-07-14 NOTE — PROGRESS NOTES
[facility]  Progress Note                   Date:   7/14/2025  Patient name: Alberto Torres  Date of admission:  7/11/2025 11:20 AM  MRN:   [id number]  YOB: 1970  PCP: No primary care provider on file.    Reason for Admission: Shortness of breath [R06.02]  Paroxysmal atrial fibrillation (HCC) [I48.0]  Bilateral leg edema [R60.0]  Congestive heart failure, unspecified HF chronicity, unspecified heart failure type (HCC) [I50.9]  Acute exacerbation of chronic heart failure (HCC) [I50.9]    Subjective:       Clinical Changes /Abnormalities: Stable overnight. Discussed with RN. Remains Afib with variable rates 80-90s at rest and up to 120's with minimal activity. Getting echo this AM. Labs, vitals, & tele reviewed.     Review of Systems    Medications:   Scheduled Meds:   furosemide  40 mg IntraVENous BID    magnesium sulfate  2,000 mg IntraVENous Once    metoprolol succinate  25 mg Oral Daily    sodium chloride flush  5-40 mL IntraVENous 2 times per day    apixaban  5 mg Oral BID    aspirin  81 mg Oral Daily    atorvastatin  80 mg Oral Nightly    amLODIPine  5 mg Oral Daily    dilTIAZem  300 mg Oral Daily     Continuous Infusions:   dilTIAZem Stopped (07/12/25 1509)    sodium chloride       CBC:   Recent Labs     07/12/25  0729 07/13/25  0422 07/14/25  0258   WBC 6.2 7.3 6.7   HGB 15.6 15.0 15.0   PLT See Reflexed IPF Result 301 346     BMP:    Recent Labs     07/12/25  0729 07/13/25  0422 07/14/25  0258    139 139   K 4.4 4.2 4.0    102 104   CO2 20 27 27   BUN 12 16 15   CREATININE 1.1 1.2 1.1   GLUCOSE 90 155* 102*     Hepatic:  Recent Labs     07/11/25  1143   AST 39   ALT 43   BILITOT 0.5   ALKPHOS 142*     Troponin:   Recent Labs     07/11/25  1143 07/11/25  1306   TROPHS 24* 20     BNP: No results for input(s): \"BNP\" in the last 72 hours.  Lipids: No results for input(s): \"CHOL\", \"HDL\" in the last 72 hours.    Invalid input(s): \"LDLCALCU\"  INR:   Recent Labs

## 2025-07-14 NOTE — PROGRESS NOTES
[facility]  Internal Medicine Teaching Residency Program  Inpatient Daily Progress Note  ______________________________________________________________________________    Patient: Alberto Torres  YOB: 1970   MRN:[id number]    Acct: [id number]     Room: 0425/0425-01  Admit date: 7/11/2025  Today's date: 07/14/25  Number of days in the hospital: 3    SUBJECTIVE   Admitting Diagnosis: Atrial fibrillation with rapid ventricular response (HCC)  CC: Shortness of breath, bilateral lower extremity edema  Pt examined at bedside. Chart & results reviewed.   No acute events overnight  Patient is hemodynamically stable blood pressure 130/105, heart rate of 101, afebrile currently on room air  UOP 5.5 L/24 hr    Morning labs unremarkable    Patient denies chest pain, shortness of breath, blurring of vision, nausea, vomiting, abdominal pain, diarrhea    Plan  - Follow-up on echo  - Cardiology on board for A-fib RVR, will appreciate recommendation  - Continue CCB and BB for rate control  - Continue Lasix 40 IV twice daily for acute CHF  - Will monitor      BRIEF HISTORY     The patient is a pleasant 54 y.o. male with a past medical history of  Atrial fibrillation   Left MCA stroke   Presented with complaint of bilateral leg swelling associated with shortness of breath.  According to the patient started having bilateral lower limb swelling a week ago which was acute in nature and progressive.  He also states that leg swelling with associated shortness of breath.    He denies recent travel, sick contact, trauma to the leg, fever, chills, rigors,.  On arrival to ED patient was on A-fib with RVR, heart rate in 130s.  Patient was started on Cardizem drip.  Initial lab work was concerning for acute CHF with elevated proBNP in 4742, chest x-ray concerning for congestive heart failure.  CT chest was negative for PE however it did show acute right heart failure.  UDS positive for

## 2025-07-14 NOTE — PLAN OF CARE
Problem: Chronic Conditions and Co-morbidities  Goal: Patient's chronic conditions and co-morbidity symptoms are monitored and maintained or improved  Outcome: Progressing  Flowsheets (Taken 7/13/2025 1949)  Care Plan - Patient's Chronic Conditions and Co-Morbidity Symptoms are Monitored and Maintained or Improved: Monitor and assess patient's chronic conditions and comorbid symptoms for stability, deterioration, or improvement     Problem: Discharge Planning  Goal: Discharge to home or other facility with appropriate resources  Outcome: Progressing  Flowsheets (Taken 7/13/2025 1949)  Discharge to home or other facility with appropriate resources: Identify barriers to discharge with patient and caregiver     Problem: Pain  Goal: Verbalizes/displays adequate comfort level or baseline comfort level  Outcome: Progressing     Problem: ABCDS Injury Assessment  Goal: Absence of physical injury  Outcome: Progressing     Problem: Safety - Adult  Goal: Free from fall injury  Outcome: Progressing

## 2025-07-14 NOTE — DISCHARGE INSTRUCTIONS
You came in because you were having bilateral leg swelling associated with shortness of breath.  When you came in, you were found to have A-fib with RVR (increased heart rate).  You were initially started on IV medication for heart rate control which was switched to oral medication later  -You were also found to be on acute congestive heart failure.  You were treated with IV diuretics (Lasix), which has been converted to oral Lasix  -You were evaluated by cardiologist, recommended being compliant with medication and outpatient cardiology follow-up  -One of your blood pressure medication Coreg is switched to metoprolol for better heart rate control  -You are also started on diuretics  (Lasix) water pill.  Please take as prescribed  -You are also found to have leaky valve (severe MR) please follow-up with cardiothoracic surgery as an outpatient  -Please take all your medication as prescribed.  Do not take all medication at once  -If you begin to experience any symptoms such as chest pain shortness of breath nausea vomiting dizziness drowsiness abdominal pain loss of consciousness or any other symptoms you find concerning please come to the ED for follow-up evaluation.    If you have been given medication please take them as prescribed. Do not take more medication than recommended at any given time.     Please follow-up with your primary care provider within 5 to 7 days for continued care.     Please feel free return to the hospital if your symptoms worsen or any new concerning symptoms develop.  Follow-up with your primary care physician as needed for all other the concerns.

## 2025-07-14 NOTE — PLAN OF CARE
Problem: Chronic Conditions and Co-morbidities  Goal: Patient's chronic conditions and co-morbidity symptoms are monitored and maintained or improved  7/14/2025 1121 by Tray Stevenson RN  Outcome: Progressing  7/14/2025 0243 by Lynda De La Garza RN  Outcome: Progressing  Flowsheets (Taken 7/13/2025 1949)  Care Plan - Patient's Chronic Conditions and Co-Morbidity Symptoms are Monitored and Maintained or Improved: Monitor and assess patient's chronic conditions and comorbid symptoms for stability, deterioration, or improvement     Problem: Discharge Planning  Goal: Discharge to home or other facility with appropriate resources  7/14/2025 1121 by Tray Stevenson RN  Outcome: Progressing  7/14/2025 0243 by Lynda De La Garza RN  Outcome: Progressing  Flowsheets (Taken 7/13/2025 1949)  Discharge to home or other facility with appropriate resources: Identify barriers to discharge with patient and caregiver     Problem: Pain  Goal: Verbalizes/displays adequate comfort level or baseline comfort level  7/14/2025 1121 by Tray Stevenson RN  Outcome: Progressing  7/14/2025 0243 by Lynda De La Garza RN  Outcome: Progressing     Problem: ABCDS Injury Assessment  Goal: Absence of physical injury  7/14/2025 1121 by Tray Stevenson RN  Outcome: Progressing  7/14/2025 0243 by Lynda De La Garza RN  Outcome: Progressing     Problem: Safety - Adult  Goal: Free from fall injury  7/14/2025 1121 by Tray Stevenson RN  Outcome: Progressing  7/14/2025 0243 by Lynda De La Garza RN  Outcome: Progressing

## 2025-07-15 PROBLEM — R60.0 BILATERAL LEG EDEMA: Status: ACTIVE | Noted: 2025-07-15

## 2025-07-15 LAB
ANION GAP SERPL CALCULATED.3IONS-SCNC: 10 MMOL/L (ref 9–16)
BASOPHILS # BLD: 0.03 K/UL (ref 0–0.2)
BASOPHILS NFR BLD: 1 % (ref 0–2)
BNP SERPL-MCNC: 2295 PG/ML (ref 0–125)
BUN SERPL-MCNC: 16 MG/DL (ref 6–20)
CALCIUM SERPL-MCNC: 8.5 MG/DL (ref 8.6–10.4)
CHLORIDE SERPL-SCNC: 102 MMOL/L (ref 98–107)
CO2 SERPL-SCNC: 27 MMOL/L (ref 20–31)
CREAT SERPL-MCNC: 1.2 MG/DL (ref 0.7–1.2)
EOSINOPHIL # BLD: 0.09 K/UL (ref 0–0.44)
EOSINOPHILS RELATIVE PERCENT: 2 % (ref 1–4)
ERYTHROCYTE [DISTWIDTH] IN BLOOD BY AUTOMATED COUNT: 15.6 % (ref 11.8–14.4)
GFR, ESTIMATED: 72 ML/MIN/1.73M2
GLUCOSE SERPL-MCNC: 95 MG/DL (ref 74–99)
HCT VFR BLD AUTO: 46.5 % (ref 40.7–50.3)
HGB BLD-MCNC: 14.8 G/DL (ref 13–17)
IMM GRANULOCYTES # BLD AUTO: <0.03 K/UL (ref 0–0.3)
IMM GRANULOCYTES NFR BLD: 0 %
LYMPHOCYTES NFR BLD: 1.13 K/UL (ref 1.1–3.7)
LYMPHOCYTES RELATIVE PERCENT: 24 % (ref 24–43)
MCH RBC QN AUTO: 26.9 PG (ref 25.2–33.5)
MCHC RBC AUTO-ENTMCNC: 31.8 G/DL (ref 28.4–34.8)
MCV RBC AUTO: 84.4 FL (ref 82.6–102.9)
MONOCYTES NFR BLD: 0.39 K/UL (ref 0.1–1.2)
MONOCYTES NFR BLD: 8 % (ref 3–12)
NEUTROPHILS NFR BLD: 65 % (ref 36–65)
NEUTS SEG NFR BLD: 2.98 K/UL (ref 1.5–8.1)
NRBC BLD-RTO: 0 PER 100 WBC
PLATELET # BLD AUTO: 293 K/UL (ref 138–453)
PMV BLD AUTO: 9.8 FL (ref 8.1–13.5)
POTASSIUM SERPL-SCNC: 3.7 MMOL/L (ref 3.7–5.3)
RBC # BLD AUTO: 5.51 M/UL (ref 4.21–5.77)
RBC # BLD: ABNORMAL 10*6/UL
SODIUM SERPL-SCNC: 139 MMOL/L (ref 136–145)
WBC OTHER # BLD: 4.6 K/UL (ref 3.5–11.3)

## 2025-07-15 PROCEDURE — 94761 N-INVAS EAR/PLS OXIMETRY MLT: CPT

## 2025-07-15 PROCEDURE — 6370000000 HC RX 637 (ALT 250 FOR IP): Performed by: NURSE PRACTITIONER

## 2025-07-15 PROCEDURE — 99233 SBSQ HOSP IP/OBS HIGH 50: CPT | Performed by: NURSE PRACTITIONER

## 2025-07-15 PROCEDURE — 2500000003 HC RX 250 WO HCPCS

## 2025-07-15 PROCEDURE — 2060000000 HC ICU INTERMEDIATE R&B

## 2025-07-15 PROCEDURE — 36415 COLL VENOUS BLD VENIPUNCTURE: CPT

## 2025-07-15 PROCEDURE — 99232 SBSQ HOSP IP/OBS MODERATE 35: CPT | Performed by: STUDENT IN AN ORGANIZED HEALTH CARE EDUCATION/TRAINING PROGRAM

## 2025-07-15 PROCEDURE — 85025 COMPLETE CBC W/AUTO DIFF WBC: CPT

## 2025-07-15 PROCEDURE — 83880 ASSAY OF NATRIURETIC PEPTIDE: CPT

## 2025-07-15 PROCEDURE — 80048 BASIC METABOLIC PNL TOTAL CA: CPT

## 2025-07-15 PROCEDURE — 6370000000 HC RX 637 (ALT 250 FOR IP)

## 2025-07-15 PROCEDURE — 6360000002 HC RX W HCPCS

## 2025-07-15 RX ORDER — METOPROLOL TARTRATE 25 MG/1
25 TABLET, FILM COATED ORAL 2 TIMES DAILY
Qty: 60 TABLET | Refills: 3 | Status: CANCELLED | OUTPATIENT
Start: 2025-07-15

## 2025-07-15 RX ORDER — METOPROLOL TARTRATE 50 MG
50 TABLET ORAL 2 TIMES DAILY
Status: DISCONTINUED | OUTPATIENT
Start: 2025-07-15 | End: 2025-07-18 | Stop reason: HOSPADM

## 2025-07-15 RX ADMIN — FUROSEMIDE 40 MG: 10 INJECTION, SOLUTION INTRAMUSCULAR; INTRAVENOUS at 18:29

## 2025-07-15 RX ADMIN — AMLODIPINE BESYLATE 5 MG: 10 TABLET ORAL at 09:25

## 2025-07-15 RX ADMIN — FUROSEMIDE 40 MG: 10 INJECTION, SOLUTION INTRAMUSCULAR; INTRAVENOUS at 09:25

## 2025-07-15 RX ADMIN — DILTIAZEM HYDROCHLORIDE 300 MG: 120 CAPSULE, COATED, EXTENDED RELEASE ORAL at 09:26

## 2025-07-15 RX ADMIN — ATORVASTATIN CALCIUM 80 MG: 80 TABLET, FILM COATED ORAL at 20:36

## 2025-07-15 RX ADMIN — APIXABAN 5 MG: 5 TABLET, FILM COATED ORAL at 09:25

## 2025-07-15 RX ADMIN — SODIUM CHLORIDE, PRESERVATIVE FREE 10 ML: 5 INJECTION INTRAVENOUS at 20:36

## 2025-07-15 RX ADMIN — ASPIRIN 81 MG: 81 TABLET, CHEWABLE ORAL at 09:27

## 2025-07-15 RX ADMIN — METOPROLOL TARTRATE 25 MG: 25 TABLET, FILM COATED ORAL at 09:26

## 2025-07-15 RX ADMIN — SODIUM CHLORIDE, PRESERVATIVE FREE 10 ML: 5 INJECTION INTRAVENOUS at 09:27

## 2025-07-15 RX ADMIN — METOPROLOL TARTRATE 50 MG: 50 TABLET, FILM COATED ORAL at 20:36

## 2025-07-15 ASSESSMENT — ENCOUNTER SYMPTOMS
COUGH: 0
ABDOMINAL PAIN: 0
SHORTNESS OF BREATH: 0
CHEST TIGHTNESS: 0

## 2025-07-15 ASSESSMENT — PAIN SCALES - GENERAL: PAINLEVEL_OUTOF10: 0

## 2025-07-15 NOTE — PROGRESS NOTES
Physician Progress Note      PATIENT:               JEFFREY FU  CSN #:                  [id number]  :                       1970  ADMIT DATE:       2025 11:20 AM  DISCH DATE:  RESPONDING  PROVIDER #:        JOHN SMITH          QUERY TEXT:    Based on your medical judgment, please clarify these findings and document if   any of the following are being evaluated and/or treated:    The clinical indicators include:  Per cardiology consult note::\" Permanent atrial fibrillation with rapid   ventricular response on Eliquis and Cardizem.  With a history of ablation for   atrial flutter in \"  Male 54 y.o. PMH Stroke, CHF, HTN  Atrial fibrillation treated with Eliquis  Options provided:  -- Secondary hypercoagulable state in a patient with atrial fibrillation  -- Other - I will add my own diagnosis  -- Disagree - Not applicable / Not valid  -- Disagree - Clinically unable to determine / Unknown  -- Refer to Clinical Documentation Reviewer    PROVIDER RESPONSE TEXT:    This patient has secondary hypercoagulable state in a patient with atrial   fibrillation.    Query created by: Kaila James on 2025 3:41 PM      Electronically signed by:  JOHN SMITH 7/15/2025 7:06 AM

## 2025-07-15 NOTE — PLAN OF CARE
Problem: Chronic Conditions and Co-morbidities  Goal: Patient's chronic conditions and co-morbidity symptoms are monitored and maintained or improved  7/15/2025 1734 by Marija Mendez RN  Outcome: Progressing  7/15/2025 0400 by Leslie Castillo RN  Outcome: Progressing  Flowsheets (Taken 7/14/2025 2000)  Care Plan - Patient's Chronic Conditions and Co-Morbidity Symptoms are Monitored and Maintained or Improved: Update acute care plan with appropriate goals if chronic or comorbid symptoms are exacerbated and prevent overall improvement and discharge     Problem: Discharge Planning  Goal: Discharge to home or other facility with appropriate resources  7/15/2025 1734 by Marija Mendez RN  Outcome: Progressing  7/15/2025 0400 by Leslie Castillo RN  Outcome: Progressing  Flowsheets (Taken 7/14/2025 2000)  Discharge to home or other facility with appropriate resources: Identify barriers to discharge with patient and caregiver     Problem: Pain  Goal: Verbalizes/displays adequate comfort level or baseline comfort level  7/15/2025 1734 by Marija Mendez RN  Outcome: Progressing  7/15/2025 0400 by Leslie Castillo RN  Outcome: Progressing     Problem: ABCDS Injury Assessment  Goal: Absence of physical injury  7/15/2025 1734 by Marija Mendez RN  Outcome: Progressing  7/15/2025 0400 by Leslie Castillo RN  Outcome: Progressing     Problem: Safety - Adult  Goal: Free from fall injury  7/15/2025 1734 by Marija Mendez RN  Outcome: Progressing  7/15/2025 0400 by Leslie Castillo RN  Outcome: Progressing

## 2025-07-15 NOTE — PLAN OF CARE
Problem: Chronic Conditions and Co-morbidities  Goal: Patient's chronic conditions and co-morbidity symptoms are monitored and maintained or improved  Outcome: Progressing  Flowsheets (Taken 7/14/2025 2000)  Care Plan - Patient's Chronic Conditions and Co-Morbidity Symptoms are Monitored and Maintained or Improved: Update acute care plan with appropriate goals if chronic or comorbid symptoms are exacerbated and prevent overall improvement and discharge     Problem: Discharge Planning  Goal: Discharge to home or other facility with appropriate resources  Outcome: Progressing  Flowsheets (Taken 7/14/2025 2000)  Discharge to home or other facility with appropriate resources: Identify barriers to discharge with patient and caregiver     Problem: Pain  Goal: Verbalizes/displays adequate comfort level or baseline comfort level  Outcome: Progressing     Problem: ABCDS Injury Assessment  Goal: Absence of physical injury  Outcome: Progressing     Problem: Safety - Adult  Goal: Free from fall injury  Outcome: Progressing

## 2025-07-15 NOTE — PROGRESS NOTES
Congestive Heart Failure Education completed and charted. CHF booklet given. Patient was receptive to education.    Discussed the  importance of medication compliance.    Discussed the importance of a heart healthy diet. Discussed 2000 mg sodium-restricted daily diet.  Patient instructed to limit fluid intake to  1.5 to 2 liters per day.    Patient instructed to weigh self at the same time of each day each morning, reinforced teaching to monitor for 3-5 lb weight increase over 1-2 days notify physician if change noted.      Signs and symptoms of CHF discussed with patient, such as feeling more tired than normal, feeling short of breath, coughing that increases when lying down, sudden weight gain, swelling of the feet, legs or belly.  Patient verbalized understanding to notify physician office if these symptoms occur.  EF 34%  Pt is agreeable to an outpatient CHF Clinic referral . Referral placed.

## 2025-07-15 NOTE — PROGRESS NOTES
[facility]  Progress Note                   Date:   7/15/2025  Patient name: Alberto Torres  Date of admission:  7/11/2025 11:20 AM  MRN:   [id number]  YOB: 1970  PCP: No primary care provider on file.    Reason for Admission: Shortness of breath [R06.02]  Paroxysmal atrial fibrillation (HCC) [I48.0]  Bilateral leg edema [R60.0]  Congestive heart failure, unspecified HF chronicity, unspecified heart failure type (HCC) [I50.9]  Acute exacerbation of chronic heart failure (HCC) [I50.9]    Subjective:       Clinical Changes /Abnormalities: Stable overnight. Discussed with RN. Remains Afib with variable rates 80-90s to 110's   Labs, vitals, & tele reviewed.     Medications:   Scheduled Meds:   furosemide  40 mg IntraVENous BID    metoprolol tartrate  25 mg Oral BID    magnesium sulfate  2,000 mg IntraVENous Once    sodium chloride flush  5-40 mL IntraVENous 2 times per day    apixaban  5 mg Oral BID    aspirin  81 mg Oral Daily    atorvastatin  80 mg Oral Nightly    amLODIPine  5 mg Oral Daily    dilTIAZem  300 mg Oral Daily     Continuous Infusions:   sodium chloride       CBC:   Recent Labs     07/13/25  0422 07/14/25  0258 07/15/25  0707   WBC 7.3 6.7 4.6   HGB 15.0 15.0 14.8    346 293     BMP:    Recent Labs     07/13/25  0422 07/14/25  0258 07/15/25  0707    139 139   K 4.2 4.0 3.7    104 102   CO2 27 27 27   BUN 16 15 16   CREATININE 1.2 1.1 1.2   GLUCOSE 155* 102* 95     Hepatic:  No results for input(s): \"AST\", \"ALT\", \"BILITOT\", \"ALKPHOS\" in the last 72 hours.    Invalid input(s): \"ALB\"    Troponin:   No results for input(s): \"TROPHS\" in the last 72 hours.    BNP: No results for input(s): \"BNP\" in the last 72 hours.  Lipids: No results for input(s): \"CHOL\", \"HDL\" in the last 72 hours.    Invalid input(s): \"LDLCALCU\"  INR:   No results for input(s): \"INR\" in the last 72 hours.      Objective:   Vitals: /88   Pulse 98   Temp 98.3 °F (36.8 °C) (Oral)

## 2025-07-15 NOTE — PROGRESS NOTES
[facility]  Internal Medicine Teaching Residency Program  Inpatient Daily Progress Note  ______________________________________________________________________________    Patient: Alberto Torres  YOB: 1970   MRN:[id number]    Acct: [id number]     Room: 0425/0425-01  Admit date: 7/11/2025  Today's date: 07/15/25  Number of days in the hospital: 4    SUBJECTIVE   Admitting Diagnosis: Atrial fibrillation with rapid ventricular response (HCC)  CC: Shortness of breath, bilateral lower extremity edema  Pt examined at bedside. Chart & results reviewed.   Patient complained of left-sided inguinal hernia.  Examination revealed inguinal hernia left side, reducible, some discomfort noted during examination.  Nontender.  Patient is hemodynamically stable blood pressure 120/96, heart rate of 83, afebrile ,currently on room air  UOP 4900 ml/24 hr  Cardioversion X2.    Atrial flutter ablation in 2022 at [facility]    Echo EF 34%,Left atrium is severely dilated , Right atrium is severely dilated , Thickened and redundant mitral valve leaflets. Severe regurgitation with a posterior directed jet ,Mild regurgitation. Moderately elevated RVSP, consistent with moderate pulmonary hypertension. Bi-lateral pleural effusion is seen.     Morning labs unremarkable        Plan  - Cardiology on board for A-fib RVR, will appreciate recommendation  - Continue CCB and BB for rate control  - Continue Lasix 40 IV twice daily for acute CHF  - Will monitor      Review of Systems   Constitutional:  Negative for diaphoresis and fever.   Respiratory:  Negative for cough, chest tightness and shortness of breath.    Cardiovascular:  Positive for leg swelling. Negative for chest pain.   Gastrointestinal:  Negative for abdominal pain.   Genitourinary:  Positive for scrotal swelling (left side inguinal hernia).   Neurological:  Negative for dizziness.        BRIEF HISTORY     The patient is a pleasant 54  (0) indicator not present

## 2025-07-16 ENCOUNTER — APPOINTMENT (OUTPATIENT)
Age: 55
End: 2025-07-16
Payer: COMMERCIAL

## 2025-07-16 PROBLEM — I34.0 MITRAL VALVE REGURGITATION: Status: ACTIVE | Noted: 2025-07-11

## 2025-07-16 PROBLEM — I42.9 CARDIOMYOPATHY (HCC): Status: ACTIVE | Noted: 2025-07-11

## 2025-07-16 LAB
ANION GAP SERPL CALCULATED.3IONS-SCNC: 11 MMOL/L (ref 9–16)
BASOPHILS # BLD: 0.03 K/UL (ref 0–0.2)
BASOPHILS NFR BLD: 1 % (ref 0–2)
BUN SERPL-MCNC: 20 MG/DL (ref 6–20)
CALCIUM SERPL-MCNC: 8.8 MG/DL (ref 8.6–10.4)
CHLORIDE SERPL-SCNC: 101 MMOL/L (ref 98–107)
CO2 SERPL-SCNC: 27 MMOL/L (ref 20–31)
CREAT SERPL-MCNC: 1.2 MG/DL (ref 0.7–1.2)
ECHO BSA: 2.02 M2
ECHO BSA: 2.02 M2
ECHO MV MAX VELOCITY: 1.4 M/S
ECHO MV MEAN GRADIENT: 3 MMHG
ECHO MV MEAN VELOCITY: 0.8 M/S
ECHO MV PEAK GRADIENT: 8 MMHG
ECHO MV REGURGITANT ALIASING (NYQUIST) VELOCITY: 39 CM/S
ECHO MV REGURGITANT PEAK GRADIENT: 121 MMHG
ECHO MV REGURGITANT PEAK VELOCITY: 5.5 M/S
ECHO MV REGURGITANT RADIUS PISA: 1.1 CM
ECHO MV REGURGITANT VTIA: 128 CM
ECHO MV VTI: 14.7 CM
EOSINOPHIL # BLD: 0.12 K/UL (ref 0–0.44)
EOSINOPHILS RELATIVE PERCENT: 2 % (ref 1–4)
ERYTHROCYTE [DISTWIDTH] IN BLOOD BY AUTOMATED COUNT: 15.4 % (ref 11.8–14.4)
GFR, ESTIMATED: 72 ML/MIN/1.73M2
GLUCOSE SERPL-MCNC: 98 MG/DL (ref 74–99)
HCT VFR BLD AUTO: 44.5 % (ref 40.7–50.3)
HGB BLD-MCNC: 14.5 G/DL (ref 13–17)
IMM GRANULOCYTES # BLD AUTO: <0.03 K/UL (ref 0–0.3)
IMM GRANULOCYTES NFR BLD: 0 %
LYMPHOCYTES NFR BLD: 1.41 K/UL (ref 1.1–3.7)
LYMPHOCYTES RELATIVE PERCENT: 27 % (ref 24–43)
MCH RBC QN AUTO: 27 PG (ref 25.2–33.5)
MCHC RBC AUTO-ENTMCNC: 32.6 G/DL (ref 28.4–34.8)
MCV RBC AUTO: 82.7 FL (ref 82.6–102.9)
MONOCYTES NFR BLD: 0.39 K/UL (ref 0.1–1.2)
MONOCYTES NFR BLD: 8 % (ref 3–12)
NEUTROPHILS NFR BLD: 62 % (ref 36–65)
NEUTS SEG NFR BLD: 3.17 K/UL (ref 1.5–8.1)
NRBC BLD-RTO: 0 PER 100 WBC
PLATELET # BLD AUTO: 277 K/UL (ref 138–453)
PMV BLD AUTO: 9.3 FL (ref 8.1–13.5)
POTASSIUM SERPL-SCNC: 3.6 MMOL/L (ref 3.7–5.3)
RBC # BLD AUTO: 5.38 M/UL (ref 4.21–5.77)
RBC # BLD: ABNORMAL 10*6/UL
SODIUM SERPL-SCNC: 139 MMOL/L (ref 136–145)
WBC OTHER # BLD: 5.1 K/UL (ref 3.5–11.3)

## 2025-07-16 PROCEDURE — 2060000000 HC ICU INTERMEDIATE R&B

## 2025-07-16 PROCEDURE — 76937 US GUIDE VASCULAR ACCESS: CPT | Performed by: INTERNAL MEDICINE

## 2025-07-16 PROCEDURE — 6360000002 HC RX W HCPCS

## 2025-07-16 PROCEDURE — C1769 GUIDE WIRE: HCPCS | Performed by: INTERNAL MEDICINE

## 2025-07-16 PROCEDURE — C1894 INTRO/SHEATH, NON-LASER: HCPCS | Performed by: INTERNAL MEDICINE

## 2025-07-16 PROCEDURE — 93451 RIGHT HEART CATH: CPT | Performed by: INTERNAL MEDICINE

## 2025-07-16 PROCEDURE — 36415 COLL VENOUS BLD VENIPUNCTURE: CPT

## 2025-07-16 PROCEDURE — 99232 SBSQ HOSP IP/OBS MODERATE 35: CPT | Performed by: STUDENT IN AN ORGANIZED HEALTH CARE EDUCATION/TRAINING PROGRAM

## 2025-07-16 PROCEDURE — 6370000000 HC RX 637 (ALT 250 FOR IP)

## 2025-07-16 PROCEDURE — B24BZZ4 ULTRASONOGRAPHY OF HEART WITH AORTA, TRANSESOPHAGEAL: ICD-10-PCS | Performed by: INTERNAL MEDICINE

## 2025-07-16 PROCEDURE — 6360000002 HC RX W HCPCS: Performed by: INTERNAL MEDICINE

## 2025-07-16 PROCEDURE — 93319 3D ECHO IMG CGEN CAR ANOMAL: CPT | Performed by: INTERNAL MEDICINE

## 2025-07-16 PROCEDURE — C1892 INTRO/SHEATH,FIXED,PEEL-AWAY: HCPCS | Performed by: INTERNAL MEDICINE

## 2025-07-16 PROCEDURE — 80048 BASIC METABOLIC PNL TOTAL CA: CPT

## 2025-07-16 PROCEDURE — 93320 DOPPLER ECHO COMPLETE: CPT | Performed by: INTERNAL MEDICINE

## 2025-07-16 PROCEDURE — 2500000003 HC RX 250 WO HCPCS

## 2025-07-16 PROCEDURE — 85025 COMPLETE CBC W/AUTO DIFF WBC: CPT

## 2025-07-16 PROCEDURE — 93454 CORONARY ARTERY ANGIO S&I: CPT | Performed by: INTERNAL MEDICINE

## 2025-07-16 PROCEDURE — C1725 CATH, TRANSLUMIN NON-LASER: HCPCS | Performed by: INTERNAL MEDICINE

## 2025-07-16 PROCEDURE — 99152 MOD SED SAME PHYS/QHP 5/>YRS: CPT | Performed by: INTERNAL MEDICINE

## 2025-07-16 PROCEDURE — 6360000004 HC RX CONTRAST MEDICATION: Performed by: INTERNAL MEDICINE

## 2025-07-16 PROCEDURE — 93312 ECHO TRANSESOPHAGEAL: CPT | Performed by: INTERNAL MEDICINE

## 2025-07-16 PROCEDURE — 93325 DOPPLER ECHO COLOR FLOW MAPG: CPT | Performed by: INTERNAL MEDICINE

## 2025-07-16 PROCEDURE — 6370000000 HC RX 637 (ALT 250 FOR IP): Performed by: INTERNAL MEDICINE

## 2025-07-16 PROCEDURE — 6370000000 HC RX 637 (ALT 250 FOR IP): Performed by: NURSE PRACTITIONER

## 2025-07-16 PROCEDURE — 2709999900 HC NON-CHARGEABLE SUPPLY: Performed by: INTERNAL MEDICINE

## 2025-07-16 PROCEDURE — 93319 3D ECHO IMG CGEN CAR ANOMAL: CPT

## 2025-07-16 PROCEDURE — 99153 MOD SED SAME PHYS/QHP EA: CPT | Performed by: INTERNAL MEDICINE

## 2025-07-16 PROCEDURE — B2151ZZ FLUOROSCOPY OF LEFT HEART USING LOW OSMOLAR CONTRAST: ICD-10-PCS | Performed by: INTERNAL MEDICINE

## 2025-07-16 PROCEDURE — B2111ZZ FLUOROSCOPY OF MULTIPLE CORONARY ARTERIES USING LOW OSMOLAR CONTRAST: ICD-10-PCS | Performed by: INTERNAL MEDICINE

## 2025-07-16 PROCEDURE — 4A023N8 MEASUREMENT OF CARDIAC SAMPLING AND PRESSURE, BILATERAL, PERCUTANEOUS APPROACH: ICD-10-PCS | Performed by: INTERNAL MEDICINE

## 2025-07-16 PROCEDURE — C1760 CLOSURE DEV, VASC: HCPCS | Performed by: INTERNAL MEDICINE

## 2025-07-16 PROCEDURE — 93456 R HRT CORONARY ARTERY ANGIO: CPT | Performed by: INTERNAL MEDICINE

## 2025-07-16 RX ORDER — LIDOCAINE HYDROCHLORIDE 20 MG/ML
SOLUTION OROPHARYNGEAL PRN
Status: DISCONTINUED | OUTPATIENT
Start: 2025-07-16 | End: 2025-07-16 | Stop reason: HOSPADM

## 2025-07-16 RX ORDER — IOPAMIDOL 755 MG/ML
INJECTION, SOLUTION INTRAVASCULAR PRN
Status: DISCONTINUED | OUTPATIENT
Start: 2025-07-16 | End: 2025-07-16 | Stop reason: HOSPADM

## 2025-07-16 RX ORDER — POTASSIUM CHLORIDE 1500 MG/1
40 TABLET, EXTENDED RELEASE ORAL ONCE
Status: COMPLETED | OUTPATIENT
Start: 2025-07-16 | End: 2025-07-16

## 2025-07-16 RX ORDER — MIDAZOLAM HYDROCHLORIDE 1 MG/ML
INJECTION, SOLUTION INTRAMUSCULAR; INTRAVENOUS PRN
Status: DISCONTINUED | OUTPATIENT
Start: 2025-07-16 | End: 2025-07-16 | Stop reason: HOSPADM

## 2025-07-16 RX ORDER — LORAZEPAM 2 MG/ML
0.5 INJECTION INTRAMUSCULAR ONCE
Status: DISCONTINUED | OUTPATIENT
Start: 2025-07-16 | End: 2025-07-18 | Stop reason: HOSPADM

## 2025-07-16 RX ORDER — HYDROXYZINE HYDROCHLORIDE 10 MG/1
10 TABLET, FILM COATED ORAL 3 TIMES DAILY PRN
Status: DISCONTINUED | OUTPATIENT
Start: 2025-07-16 | End: 2025-07-18 | Stop reason: HOSPADM

## 2025-07-16 RX ORDER — FENTANYL CITRATE 50 UG/ML
INJECTION, SOLUTION INTRAMUSCULAR; INTRAVENOUS PRN
Status: DISCONTINUED | OUTPATIENT
Start: 2025-07-16 | End: 2025-07-16 | Stop reason: HOSPADM

## 2025-07-16 RX ADMIN — ASPIRIN 81 MG: 81 TABLET, CHEWABLE ORAL at 09:47

## 2025-07-16 RX ADMIN — METOPROLOL TARTRATE 5 MG: 1 INJECTION, SOLUTION INTRAVENOUS at 21:47

## 2025-07-16 RX ADMIN — AMLODIPINE BESYLATE 5 MG: 10 TABLET ORAL at 09:47

## 2025-07-16 RX ADMIN — METOPROLOL TARTRATE 50 MG: 50 TABLET, FILM COATED ORAL at 09:47

## 2025-07-16 RX ADMIN — POTASSIUM CHLORIDE 40 MEQ: 1500 TABLET, EXTENDED RELEASE ORAL at 06:28

## 2025-07-16 RX ADMIN — DILTIAZEM HYDROCHLORIDE 300 MG: 120 CAPSULE, COATED, EXTENDED RELEASE ORAL at 09:48

## 2025-07-16 RX ADMIN — SODIUM CHLORIDE, PRESERVATIVE FREE 10 ML: 5 INJECTION INTRAVENOUS at 20:22

## 2025-07-16 RX ADMIN — METOPROLOL TARTRATE 50 MG: 50 TABLET, FILM COATED ORAL at 20:22

## 2025-07-16 RX ADMIN — SODIUM CHLORIDE, PRESERVATIVE FREE 10 ML: 5 INJECTION INTRAVENOUS at 09:52

## 2025-07-16 RX ADMIN — FUROSEMIDE 40 MG: 10 INJECTION, SOLUTION INTRAMUSCULAR; INTRAVENOUS at 18:37

## 2025-07-16 RX ADMIN — ATORVASTATIN CALCIUM 80 MG: 80 TABLET, FILM COATED ORAL at 20:22

## 2025-07-16 RX ADMIN — FUROSEMIDE 40 MG: 10 INJECTION, SOLUTION INTRAMUSCULAR; INTRAVENOUS at 09:47

## 2025-07-16 ASSESSMENT — ENCOUNTER SYMPTOMS
ABDOMINAL PAIN: 0
SHORTNESS OF BREATH: 0
COUGH: 0
CHEST TIGHTNESS: 0

## 2025-07-16 ASSESSMENT — PAIN SCALES - GENERAL: PAINLEVEL_OUTOF10: 0

## 2025-07-16 NOTE — PROGRESS NOTES
Patient admitted, consent signed and questions answered. Patient ready for procedure. Call light to reach with side rails up 2 of 2. Right groin hair clipped with writer and Tere present.  No one at bedside with patient.  History and physical complete.

## 2025-07-16 NOTE — PLAN OF CARE
Problem: Chronic Conditions and Co-morbidities  Goal: Patient's chronic conditions and co-morbidity symptoms are monitored and maintained or improved  7/16/2025 1802 by Marija Mendez RN  Outcome: Progressing  7/16/2025 0437 by Leslie Castillo RN  Outcome: Progressing  Flowsheets (Taken 7/15/2025 2030)  Care Plan - Patient's Chronic Conditions and Co-Morbidity Symptoms are Monitored and Maintained or Improved: Monitor and assess patient's chronic conditions and comorbid symptoms for stability, deterioration, or improvement     Problem: Discharge Planning  Goal: Discharge to home or other facility with appropriate resources  7/16/2025 1802 by Marija Mendez RN  Outcome: Progressing  7/16/2025 0437 by Leslie Castillo RN  Outcome: Progressing  Flowsheets (Taken 7/15/2025 2030)  Discharge to home or other facility with appropriate resources: Identify discharge learning needs (meds, wound care, etc)     Problem: Pain  Goal: Verbalizes/displays adequate comfort level or baseline comfort level  7/16/2025 1802 by Marija Mendez RN  Outcome: Progressing  7/16/2025 0437 by Leslie Castillo RN  Outcome: Progressing     Problem: ABCDS Injury Assessment  Goal: Absence of physical injury  7/16/2025 1802 by Marija Mendez RN  Outcome: Progressing  7/16/2025 0437 by Leslie Castillo RN  Outcome: Progressing     Problem: Safety - Adult  Goal: Free from fall injury  7/16/2025 1802 by Marija Mendez RN  Outcome: Progressing  7/16/2025 0437 by Leslie Castillo RN  Outcome: Progressing

## 2025-07-16 NOTE — PROGRESS NOTES
Physical Therapy        Physical Therapy Cancel Note      DATE: 2025    NAME: Alberto Torres  MRN: [id number]   : 1970      Patient not seen this date for Physical Therapy due to:    Other: plan for R and L heart cath today. PT will check back 25 for post-procedure needs.       Electronically signed by Rhianna Beal PT on 2025 at 8:56 AM

## 2025-07-16 NOTE — PLAN OF CARE
Problem: Chronic Conditions and Co-morbidities  Goal: Patient's chronic conditions and co-morbidity symptoms are monitored and maintained or improved  7/16/2025 0437 by Leslie Castillo RN  Outcome: Progressing  Flowsheets (Taken 7/15/2025 2030)  Care Plan - Patient's Chronic Conditions and Co-Morbidity Symptoms are Monitored and Maintained or Improved: Monitor and assess patient's chronic conditions and comorbid symptoms for stability, deterioration, or improvement  7/15/2025 1734 by Marija Mendez RN  Outcome: Progressing     Problem: Discharge Planning  Goal: Discharge to home or other facility with appropriate resources  7/16/2025 0437 by Leslie Castillo RN  Outcome: Progressing  Flowsheets (Taken 7/15/2025 2030)  Discharge to home or other facility with appropriate resources: Identify discharge learning needs (meds, wound care, etc)  7/15/2025 1734 by Marija Mendez RN  Outcome: Progressing     Problem: Pain  Goal: Verbalizes/displays adequate comfort level or baseline comfort level  7/16/2025 0437 by Leslie Castillo RN  Outcome: Progressing  7/15/2025 1734 by Marija Mendez RN  Outcome: Progressing     Problem: ABCDS Injury Assessment  Goal: Absence of physical injury  7/16/2025 0437 by Leslie Castillo RN  Outcome: Progressing  7/15/2025 1734 by Marija Mendez RN  Outcome: Progressing     Problem: Safety - Adult  Goal: Free from fall injury  7/16/2025 0437 by Leslie Castillo RN  Outcome: Progressing  7/15/2025 1734 by Marija Mendez RN  Outcome: Progressing

## 2025-07-16 NOTE — PROGRESS NOTES
Occupational Therapy      [facility]  Occupational Therapy Not Seen Note    DATE: 2025    NAME: Alberto Torres  MRN: [id number]   : 1970      Patient not seen this date for Occupational Therapy due to:    Patient is not appropriate for active participation in OT evaluation/treatment at this time d/t pending heart cath thi date, will f/u post procedure       Electronically signed by Mery Powell OT on 2025 at 3:00 PM

## 2025-07-17 ENCOUNTER — APPOINTMENT (OUTPATIENT)
Dept: CT IMAGING | Age: 55
End: 2025-07-17
Payer: COMMERCIAL

## 2025-07-17 ENCOUNTER — APPOINTMENT (OUTPATIENT)
Dept: VASCULAR LAB | Age: 55
End: 2025-07-17
Payer: COMMERCIAL

## 2025-07-17 LAB
ANION GAP SERPL CALCULATED.3IONS-SCNC: 13 MMOL/L (ref 9–16)
BASOPHILS # BLD: 0.03 K/UL (ref 0–0.2)
BASOPHILS NFR BLD: 1 % (ref 0–2)
BNP SERPL-MCNC: 1936 PG/ML (ref 0–125)
BUN SERPL-MCNC: 21 MG/DL (ref 6–20)
CALCIUM SERPL-MCNC: 8.6 MG/DL (ref 8.6–10.4)
CHLORIDE SERPL-SCNC: 97 MMOL/L (ref 98–107)
CO2 SERPL-SCNC: 24 MMOL/L (ref 20–31)
CREAT SERPL-MCNC: 1.3 MG/DL (ref 0.7–1.2)
EOSINOPHIL # BLD: 0.08 K/UL (ref 0–0.44)
EOSINOPHILS RELATIVE PERCENT: 1 % (ref 1–4)
ERYTHROCYTE [DISTWIDTH] IN BLOOD BY AUTOMATED COUNT: 15.6 % (ref 11.8–14.4)
GFR, ESTIMATED: 65 ML/MIN/1.73M2
GLUCOSE SERPL-MCNC: 88 MG/DL (ref 74–99)
HCT VFR BLD AUTO: 49 % (ref 40.7–50.3)
HGB BLD-MCNC: 16.1 G/DL (ref 13–17)
IMM GRANULOCYTES # BLD AUTO: <0.03 K/UL (ref 0–0.3)
IMM GRANULOCYTES NFR BLD: 0 %
LYMPHOCYTES NFR BLD: 0.88 K/UL (ref 1.1–3.7)
LYMPHOCYTES RELATIVE PERCENT: 16 % (ref 24–43)
MCH RBC QN AUTO: 27.1 PG (ref 25.2–33.5)
MCHC RBC AUTO-ENTMCNC: 32.9 G/DL (ref 28.4–34.8)
MCV RBC AUTO: 82.4 FL (ref 82.6–102.9)
MONOCYTES NFR BLD: 0.57 K/UL (ref 0.1–1.2)
MONOCYTES NFR BLD: 10 % (ref 3–12)
NEUTROPHILS NFR BLD: 72 % (ref 36–65)
NEUTS SEG NFR BLD: 3.99 K/UL (ref 1.5–8.1)
NRBC BLD-RTO: 0 PER 100 WBC
PLATELET # BLD AUTO: 350 K/UL (ref 138–453)
PMV BLD AUTO: 9.5 FL (ref 8.1–13.5)
POTASSIUM SERPL-SCNC: 4 MMOL/L (ref 3.7–5.3)
RBC # BLD AUTO: 5.95 M/UL (ref 4.21–5.77)
RBC # BLD: ABNORMAL 10*6/UL
SODIUM SERPL-SCNC: 134 MMOL/L (ref 136–145)
WBC OTHER # BLD: 5.6 K/UL (ref 3.5–11.3)

## 2025-07-17 PROCEDURE — 2500000003 HC RX 250 WO HCPCS

## 2025-07-17 PROCEDURE — 99233 SBSQ HOSP IP/OBS HIGH 50: CPT | Performed by: NURSE PRACTITIONER

## 2025-07-17 PROCEDURE — 80048 BASIC METABOLIC PNL TOTAL CA: CPT

## 2025-07-17 PROCEDURE — 85025 COMPLETE CBC W/AUTO DIFF WBC: CPT

## 2025-07-17 PROCEDURE — 99222 1ST HOSP IP/OBS MODERATE 55: CPT

## 2025-07-17 PROCEDURE — 6370000000 HC RX 637 (ALT 250 FOR IP): Performed by: NURSE PRACTITIONER

## 2025-07-17 PROCEDURE — 36415 COLL VENOUS BLD VENIPUNCTURE: CPT

## 2025-07-17 PROCEDURE — 99222 1ST HOSP IP/OBS MODERATE 55: CPT | Performed by: SURGERY

## 2025-07-17 PROCEDURE — 2060000000 HC ICU INTERMEDIATE R&B

## 2025-07-17 PROCEDURE — 83880 ASSAY OF NATRIURETIC PEPTIDE: CPT

## 2025-07-17 PROCEDURE — 6360000002 HC RX W HCPCS

## 2025-07-17 PROCEDURE — 74176 CT ABD & PELVIS W/O CONTRAST: CPT

## 2025-07-17 PROCEDURE — 93880 EXTRACRANIAL BILAT STUDY: CPT

## 2025-07-17 PROCEDURE — 99232 SBSQ HOSP IP/OBS MODERATE 35: CPT | Performed by: STUDENT IN AN ORGANIZED HEALTH CARE EDUCATION/TRAINING PROGRAM

## 2025-07-17 PROCEDURE — 6370000000 HC RX 637 (ALT 250 FOR IP)

## 2025-07-17 RX ADMIN — AMLODIPINE BESYLATE 5 MG: 10 TABLET ORAL at 07:54

## 2025-07-17 RX ADMIN — METOPROLOL TARTRATE 50 MG: 50 TABLET, FILM COATED ORAL at 07:55

## 2025-07-17 RX ADMIN — ASPIRIN 81 MG: 81 TABLET, CHEWABLE ORAL at 07:55

## 2025-07-17 RX ADMIN — FUROSEMIDE 40 MG: 10 INJECTION, SOLUTION INTRAMUSCULAR; INTRAVENOUS at 07:54

## 2025-07-17 RX ADMIN — ACETAMINOPHEN 650 MG: 325 TABLET ORAL at 07:53

## 2025-07-17 RX ADMIN — SODIUM CHLORIDE, PRESERVATIVE FREE 10 ML: 5 INJECTION INTRAVENOUS at 20:08

## 2025-07-17 RX ADMIN — APIXABAN 5 MG: 5 TABLET, FILM COATED ORAL at 20:06

## 2025-07-17 RX ADMIN — METOPROLOL TARTRATE 50 MG: 50 TABLET, FILM COATED ORAL at 20:06

## 2025-07-17 RX ADMIN — ATORVASTATIN CALCIUM 80 MG: 80 TABLET, FILM COATED ORAL at 20:06

## 2025-07-17 RX ADMIN — DILTIAZEM HYDROCHLORIDE 300 MG: 120 CAPSULE, COATED, EXTENDED RELEASE ORAL at 07:54

## 2025-07-17 RX ADMIN — SODIUM CHLORIDE, PRESERVATIVE FREE 10 ML: 5 INJECTION INTRAVENOUS at 07:55

## 2025-07-17 ASSESSMENT — PAIN DESCRIPTION - ORIENTATION: ORIENTATION: RIGHT;LEFT

## 2025-07-17 ASSESSMENT — ENCOUNTER SYMPTOMS
COUGH: 0
CHEST TIGHTNESS: 0
ABDOMINAL PAIN: 0
SHORTNESS OF BREATH: 0

## 2025-07-17 ASSESSMENT — PAIN DESCRIPTION - LOCATION: LOCATION: TEETH;LEG

## 2025-07-17 ASSESSMENT — PAIN SCALES - GENERAL
PAINLEVEL_OUTOF10: 8
PAINLEVEL_OUTOF10: 0

## 2025-07-17 ASSESSMENT — PAIN DESCRIPTION - DESCRIPTORS: DESCRIPTORS: ACHING;DISCOMFORT

## 2025-07-17 NOTE — PROGRESS NOTES
[facility]  Progress Note                   Date:   7/17/2025  Patient name: Alberto Torres  Date of admission:  7/11/2025 11:20 AM  MRN:   [id number]  YOB: 1970  PCP: No primary care provider on file.    Reason for Admission: Shortness of breath [R06.02]  Paroxysmal atrial fibrillation (HCC) [I48.0]  Bilateral leg edema [R60.0]  Congestive heart failure, unspecified HF chronicity, unspecified heart failure type (HCC) [I50.9]  Acute exacerbation of chronic heart failure (HCC) [I50.9]    Subjective:       Clinical Changes /Abnormalities: Pt seen and examined in the room.  Patient resting in bed. Pt denies any CP. Patient reports intermittent shortness of breath.  Labs, vitals and tele reviewed- A-fib 89    Medications:   Scheduled Meds:   LORazepam  0.5 mg IntraVENous Once    metoprolol tartrate  50 mg Oral BID    [Held by provider] furosemide  40 mg IntraVENous BID    magnesium sulfate  2,000 mg IntraVENous Once    sodium chloride flush  5-40 mL IntraVENous 2 times per day    [Held by provider] apixaban  5 mg Oral BID    aspirin  81 mg Oral Daily    atorvastatin  80 mg Oral Nightly    amLODIPine  5 mg Oral Daily    dilTIAZem  300 mg Oral Daily     Continuous Infusions:   sodium chloride       CBC:   Recent Labs     07/15/25  0707 07/16/25  0335 07/17/25 0317   WBC 4.6 5.1 5.6   HGB 14.8 14.5 16.1    277 350     BMP:    Recent Labs     07/15/25  0707 07/16/25  0335 07/17/25 0317    139 134*   K 3.7 3.6* 4.0    101 97*   CO2 27 27 24   BUN 16 20 21*   CREATININE 1.2 1.2 1.3*   GLUCOSE 95 98 88     Hepatic:  No results for input(s): \"AST\", \"ALT\", \"BILITOT\", \"ALKPHOS\" in the last 72 hours.    Invalid input(s): \"ALB\"    Troponin:   No results for input(s): \"TROPHS\" in the last 72 hours.    BNP: No results for input(s): \"BNP\" in the last 72 hours.  Lipids: No results for input(s): \"CHOL\", \"HDL\" in the last 72 hours.    Invalid input(s): \"LDLCALCU\"  INR:   No results

## 2025-07-17 NOTE — PLAN OF CARE
Problem: Chronic Conditions and Co-morbidities  Goal: Patient's chronic conditions and co-morbidity symptoms are monitored and maintained or improved  7/17/2025 1544 by Faiza Larios RN  Outcome: Progressing  7/17/2025 0513 by Zachary Olivera RN  Outcome: Progressing     Problem: Discharge Planning  Goal: Discharge to home or other facility with appropriate resources  7/17/2025 1544 by Faiza Larios RN  Outcome: Progressing  7/17/2025 0513 by Zachary Olivera RN  Outcome: Progressing     Problem: Pain  Goal: Verbalizes/displays adequate comfort level or baseline comfort level  7/17/2025 1544 by Faiza Larios RN  Outcome: Progressing  7/17/2025 0513 by Zachary Olivera RN  Outcome: Progressing     Problem: ABCDS Injury Assessment  Goal: Absence of physical injury  7/17/2025 1544 by Faiza Larios RN  Outcome: Progressing  7/17/2025 0513 by Zachary Olivera RN  Outcome: Progressing     Problem: Safety - Adult  Goal: Free from fall injury  7/17/2025 1544 by Faiza Larios RN  Outcome: Progressing  7/17/2025 0513 by Zachary Olivera RN  Outcome: Progressing

## 2025-07-17 NOTE — PLAN OF CARE
Problem: Chronic Conditions and Co-morbidities  Goal: Patient's chronic conditions and co-morbidity symptoms are monitored and maintained or improved  7/17/2025 0513 by Zachary Olivera RN  Outcome: Progressing  7/16/2025 1802 by Marija Mendez RN  Outcome: Progressing     Problem: Discharge Planning  Goal: Discharge to home or other facility with appropriate resources  7/17/2025 0513 by Zachary Olivera RN  Outcome: Progressing  7/16/2025 1802 by Marija Mendez RN  Outcome: Progressing     Problem: Pain  Goal: Verbalizes/displays adequate comfort level or baseline comfort level  7/17/2025 0513 by Zachary Olivera RN  Outcome: Progressing  7/16/2025 1802 by Marija Mendez RN  Outcome: Progressing     Problem: ABCDS Injury Assessment  Goal: Absence of physical injury  7/17/2025 0513 by Zachary Olivera RN  Outcome: Progressing  7/16/2025 1802 by Marija Mendez RN  Outcome: Progressing     Problem: Safety - Adult  Goal: Free from fall injury  7/17/2025 0513 by Zachary Olivera RN  Outcome: Progressing  7/16/2025 1802 by Marija Mendez RN  Outcome: Progressing

## 2025-07-17 NOTE — PROGRESS NOTES
Physical Therapy        Physical Therapy Cancel Note      DATE: 2025    NAME: Alberto Torres  MRN: [id number]   : 1970      Patient not seen this date for Physical Therapy due to:    Patient independent with functional mobility, walking off floor. Will defer PT evaluation at this time. Please reorder PT if future needs arise.       Electronically signed by Denise Guadarrama PT on 2025 at 3:07 PM

## 2025-07-17 NOTE — CARE COORDINATION
Consult received during quality flow rounds as pt with recent drug use.  Met with pt who stated he lives with his gfriend. Stated he is not currently working, has [employer] in place. Pt had questions about his food stamps and advised him he needs to call [employer].  Pt Stated he has been drinking alcohol daily, 3-4  22oz beers. He stated he smokes marijuana a few times a week. He stated cocaine is DOC and he smokes crack 3-4x week. He denies all other drug use. Pt stated he has been thru tx in the past at [facility] and [facility]. Stated he had an 8y period of sobriety (3041-7384), while in snf. Stated he was very active in recovery during that time. Pt shared he has been out of snf for a year. He attributes his relapse to going back around the same people and places. Pt stated \"I'm done with it\" and he recognizes how it has affected his heart, \"I didn't know it was this bad\". Pt shared he plans to quit. Discussed getting back into tx. Pt feels he can quit and talked about getting back into AA/NA. He was receptive to receiving tx resources -provided pt with list of area tx programs and walk in assessment clinics. Also provided pt with info on relapse/recovery issues.

## 2025-07-17 NOTE — CONSULTS
General Surgery  Consult    PATIENT NAME: Alberto Torres  AGE: 54 y.o.  MEDICAL RECORD NO. [id number]  DATE: 7/17/2025  SURGEON: Dr. Crow  PRIMARY CARE PHYSICIAN: No primary care provider on file.    Patient evaluated at the request of  Dr. Evans  Reason for evaluation: \"left inguinal hernia, c/o pain\"    Patient information was obtained from patient.  History/Exam limitations: none.    IMPRESSION:     Patient Active Problem List   Diagnosis    Acute ischemic left MCA stroke (HCC)    History of atrial fibrillation    Subtherapeutic international normalized ratio (INR)    Cerebrovascular accident (CVA) due to occlusion of left middle cerebral artery (HCC)    Atrial fibrillation with rapid ventricular response (HCC)    Cerebrovascular accident (CVA) due to embolism of left middle cerebral artery (HCC)    Acute CHF (congestive heart failure) (HCC)    Polysubstance abuse (HCC)    Primary hypertension    Congestive heart failure (HCC)    Paroxysmal atrial fibrillation (HCC)    Bilateral leg edema    Cardiomyopathy (HCC)    Mitral valve regurgitation     Left inguinal hernia      PLAN:   AF, intermittent tachycardia 111-137  No leukocytosis, H/H stable, BNP 1,936 (down from 2,295), BUN/Cr 21/1.3  RCRI 2  Hernia is reducible without overlying skin changes.  CT ab/pelvis w/o contrast pending  Recommend elective outpatient left inguinal hernia repair once cleared by cardiology  No further surgery intervention indicated or planned at this time. We will sign off at this time. Please reconsult/call if additional questions, concerns, or issues develop requiring further surgery input.       HISTORY:   History of Chief Complaint:    Alberto Torres is a 54 y.o. male with a history of right-sided stroke, CHF, Afib (on Eliquis), HTN who presents with a reducible left inguinal hernia for the past couple months that has become increasingly painful and more difficult to reduce. He is admitted for CHF and Afib with RVR. He had a

## 2025-07-17 NOTE — CONSULTS
[facility] Cardiothoracic Surgery  Consult    Patient's Name/Date of Birth: Alberto Torres / 1970 (54 y.o.)    Date: July 17, 2025   Admit date: 7/11/25    Chief Complaint: Leg swelling    HPI: Alberto Torres is a 54 y.o.   male who was referred to cardiothoracic surgery for mitral valve repair versus replacement.  He originally presented to the ED 6 days ago, with complaints of leg swelling, shortness of breath.  Upon evaluation he stated that he ran out of his medication and have been noncompliant in the last few months and just started retaking his medication this past week.  Due to his cardiac symptoms and history he then underwent cardiac catheterization which was negative for multivessel.  However, echo showed severe mitral regurgitation with multiple jets.     Pertinent cardiac history includes atrial fibrillation (on Eliquis).  Current alcoholism of 4-5 tall boys daily.  Current smoker.  Positive cocaine and marijuana ( last used 7/10/25)     Is patient on any AC or AP medication prior to CTS consult?  Eliquis    ROS:   CONSTITUTIONAL:  fatigue  Respiratory: positive for dyspnea on exertion  Cardiovascular: negative for CP  Gastrointestinal: negative  Genitourinary:negative  Hematologic/lymphatic: negative  Musculoskeletal:negative  Neurological: negative  Endocrine: negative  Psychiatric: negative  Past Medical History:   Diagnosis Date    Anxiety     Atrial fibrillation (HCC)     Bipolar 1 disorder (HCC)     Clinical trial participant 07/08/2024    enrolled in [employer] trial - anticipated completion October 2024    Depression     Hypertension     Lactose intolerance      History reviewed. No pertinent surgical history.  No Known Allergies  History reviewed. No pertinent family history.  Social History     Socioeconomic History    Marital status: Single     Spouse name: Not on file    Number of children: Not on file    Years of education: Not on file    Highest education level: Not on file

## 2025-07-17 NOTE — CARE COORDINATION
Case Management   Daily Progress Note       Patient Name: Alberto Torres                   YOB: 1970  Diagnosis: Shortness of breath [R06.02]  Paroxysmal atrial fibrillation (HCC) [I48.0]  Bilateral leg edema [R60.0]  Congestive heart failure, unspecified HF chronicity, unspecified heart failure type (HCC) [I50.9]  Acute exacerbation of chronic heart failure (HCC) [I50.9]                       GMLOS: 3 days  Length of Stay: 6  days    Anticipated Discharge Date: To be determined    Readmission Risk (Low < 19, Mod (19-27), High > 27): Readmission Risk Score: 11.6        Current Transitional Plan    [x] Home Independently    [] Home with HC    [] Skilled Nursing Facility    [] Acute Rehabilitation    [] Long Term Acute Care (LTAC)    [] Other:     Plan for the Stay (Medical Management) :    Awaiting CT surgery consult for valve workup      Workflow Continuation (Additional Notes) :    Plan is to return home with significant other. Patient will need cab ride home.     Soraya Rodarte RN  July 17, 2025

## 2025-07-17 NOTE — PROGRESS NOTES
[facility]  Internal Medicine Teaching Residency Program  Inpatient Daily Progress Note  ______________________________________________________________________________    Patient: Alberto Torres  YOB: 1970   MRN:[id number]    Acct: [id number]     Room: 0425/0425-01  Admit date: 7/11/2025  Today's date: 07/17/25  Number of days in the hospital: 6    SUBJECTIVE   Admitting Diagnosis: Atrial fibrillation with rapid ventricular response (HCC)  CC: Shortness of breath, bilateral lower extremity edema  Pt examined at bedside. Chart & results reviewed.     Patient is hemodynamically stable bilateral leg edema swelling down ,no edema on examination today.  Cardioversion X2 and Atrial flutter ablation in 2022 at [facility]  Heart rate 91, blood pressure 128/89  Sodium 134, chloride 97, BUN 21, creatinine 1.3, NT proBNP 1936    Left and right heart catheterization done.  High wedge pressure, high V wave.  Mildly elevated pulmonary pressure and severe mitral regurgitation by TRINY.  Recommend CT surgery consult for MV repair/replacement  TRINY severe mitral valve regurgitation and dilated left atrium.  No left atrial appendage thrombus noted    Echo EF 34%,Left atrium is severely dilated , Right atrium is severely dilated , Thickened and redundant mitral valve leaflets. Severe regurgitation with a posterior directed jet ,Mild regurgitation. Moderately elevated RVSP, consistent with moderate pulmonary hypertension. Bi-lateral pleural effusion is seen.     Labs.  Sodium 134, chloride 97, BUN 21, creatinine 1.3.  Deteriorating renal functions possibly because of IV Lasix, will hold for 24 hours and repeat BMP.            Review of Systems   Constitutional:  Negative for diaphoresis and fever.   Respiratory:  Negative for cough, chest tightness and shortness of breath.    Cardiovascular:  Negative for chest pain and leg swelling.   Gastrointestinal:  Negative for abdominal pain.

## 2025-07-18 VITALS
DIASTOLIC BLOOD PRESSURE: 75 MMHG | BODY MASS INDEX: 22.96 KG/M2 | TEMPERATURE: 98.2 F | SYSTOLIC BLOOD PRESSURE: 109 MMHG | HEIGHT: 72 IN | HEART RATE: 92 BPM | RESPIRATION RATE: 19 BRPM | OXYGEN SATURATION: 99 % | WEIGHT: 169.53 LBS

## 2025-07-18 LAB
ANION GAP SERPL CALCULATED.3IONS-SCNC: 12 MMOL/L (ref 9–16)
BASOPHILS # BLD: 0.03 K/UL (ref 0–0.2)
BASOPHILS NFR BLD: 1 % (ref 0–2)
BUN SERPL-MCNC: 20 MG/DL (ref 6–20)
CALCIUM SERPL-MCNC: 8.8 MG/DL (ref 8.6–10.4)
CHLORIDE SERPL-SCNC: 101 MMOL/L (ref 98–107)
CO2 SERPL-SCNC: 24 MMOL/L (ref 20–31)
CREAT SERPL-MCNC: 1.1 MG/DL (ref 0.7–1.2)
ECHO BSA: 2.02 M2
EOSINOPHIL # BLD: 0.1 K/UL (ref 0–0.44)
EOSINOPHILS RELATIVE PERCENT: 2 % (ref 1–4)
ERYTHROCYTE [DISTWIDTH] IN BLOOD BY AUTOMATED COUNT: 14.9 % (ref 11.8–14.4)
GFR, ESTIMATED: 80 ML/MIN/1.73M2
GLUCOSE SERPL-MCNC: 129 MG/DL (ref 74–99)
HCT VFR BLD AUTO: 45.5 % (ref 40.7–50.3)
HGB BLD-MCNC: 14.7 G/DL (ref 13–17)
IMM GRANULOCYTES # BLD AUTO: <0.03 K/UL (ref 0–0.3)
IMM GRANULOCYTES NFR BLD: 0 %
LYMPHOCYTES NFR BLD: 1.4 K/UL (ref 1.1–3.7)
LYMPHOCYTES RELATIVE PERCENT: 29 % (ref 24–43)
MCH RBC QN AUTO: 26.8 PG (ref 25.2–33.5)
MCHC RBC AUTO-ENTMCNC: 32.3 G/DL (ref 28.4–34.8)
MCV RBC AUTO: 83 FL (ref 82.6–102.9)
MONOCYTES NFR BLD: 0.33 K/UL (ref 0.1–1.2)
MONOCYTES NFR BLD: 7 % (ref 3–12)
NEUTROPHILS NFR BLD: 61 % (ref 36–65)
NEUTS SEG NFR BLD: 2.89 K/UL (ref 1.5–8.1)
NRBC BLD-RTO: 0 PER 100 WBC
PLATELET # BLD AUTO: 300 K/UL (ref 138–453)
PMV BLD AUTO: 10.1 FL (ref 8.1–13.5)
POTASSIUM SERPL-SCNC: 3.8 MMOL/L (ref 3.7–5.3)
RBC # BLD AUTO: 5.48 M/UL (ref 4.21–5.77)
RBC # BLD: ABNORMAL 10*6/UL
SODIUM SERPL-SCNC: 137 MMOL/L (ref 136–145)
VAS LEFT CCA DIST EDV: 10.3 CM/S
VAS LEFT CCA DIST PSV: 55.8 CM/S
VAS LEFT CCA MID EDV: 12.1 CM/S
VAS LEFT CCA MID PSV: 86.7 CM/S
VAS LEFT CCA PROX EDV: 21 CM/S
VAS LEFT CCA PROX PSV: 124 CM/S
VAS LEFT ECA EDV: 6.9 CM/S
VAS LEFT ECA PSV: 44.5 CM/S
VAS LEFT ICA DIST EDV: 28.5 CM/S
VAS LEFT ICA DIST PSV: 90 CM/S
VAS LEFT ICA MID EDV: 32.3 CM/S
VAS LEFT ICA MID PSV: 112 CM/S
VAS LEFT ICA PROX EDV: 6.6 CM/S
VAS LEFT ICA PROX PSV: 51.4 CM/S
VAS LEFT ICA/CCA PSV: 2.01
VAS LEFT VERTEBRAL EDV: 5.96 CM/S
VAS LEFT VERTEBRAL PSV: 33.2 CM/S
VAS RIGHT CCA DIST EDV: 11 CM/S
VAS RIGHT CCA DIST PSV: 78.1 CM/S
VAS RIGHT CCA MID EDV: 14.3 CM/S
VAS RIGHT CCA MID PSV: 107 CM/S
VAS RIGHT CCA PROX EDV: 16.2 CM/S
VAS RIGHT CCA PROX PSV: 112 CM/S
VAS RIGHT ECA EDV: 8.78 CM/S
VAS RIGHT ECA PSV: 52.7 CM/S
VAS RIGHT ICA DIST EDV: 23.5 CM/S
VAS RIGHT ICA DIST PSV: 83.1 CM/S
VAS RIGHT ICA MID EDV: 14.9 CM/S
VAS RIGHT ICA MID PSV: 101 CM/S
VAS RIGHT ICA PROX EDV: 7.5 CM/S
VAS RIGHT ICA PROX PSV: 40.4 CM/S
VAS RIGHT ICA/CCA PSV: 1.29
VAS RIGHT VERTEBRAL EDV: 14.7 CM/S
VAS RIGHT VERTEBRAL PSV: 48.3 CM/S
WBC OTHER # BLD: 4.8 K/UL (ref 3.5–11.3)

## 2025-07-18 PROCEDURE — 36415 COLL VENOUS BLD VENIPUNCTURE: CPT

## 2025-07-18 PROCEDURE — 99233 SBSQ HOSP IP/OBS HIGH 50: CPT | Performed by: NURSE PRACTITIONER

## 2025-07-18 PROCEDURE — 85025 COMPLETE CBC W/AUTO DIFF WBC: CPT

## 2025-07-18 PROCEDURE — 99232 SBSQ HOSP IP/OBS MODERATE 35: CPT | Performed by: STUDENT IN AN ORGANIZED HEALTH CARE EDUCATION/TRAINING PROGRAM

## 2025-07-18 PROCEDURE — 93880 EXTRACRANIAL BILAT STUDY: CPT | Performed by: STUDENT IN AN ORGANIZED HEALTH CARE EDUCATION/TRAINING PROGRAM

## 2025-07-18 PROCEDURE — 2500000003 HC RX 250 WO HCPCS

## 2025-07-18 PROCEDURE — 6370000000 HC RX 637 (ALT 250 FOR IP): Performed by: NURSE PRACTITIONER

## 2025-07-18 PROCEDURE — 80048 BASIC METABOLIC PNL TOTAL CA: CPT

## 2025-07-18 PROCEDURE — 6370000000 HC RX 637 (ALT 250 FOR IP)

## 2025-07-18 RX ORDER — METOPROLOL TARTRATE 50 MG
50 TABLET ORAL 2 TIMES DAILY
Qty: 60 TABLET | Refills: 3 | Status: SHIPPED | OUTPATIENT
Start: 2025-07-18

## 2025-07-18 RX ORDER — FUROSEMIDE 20 MG/1
20 TABLET ORAL DAILY
Qty: 30 TABLET | Refills: 1 | Status: SHIPPED | OUTPATIENT
Start: 2025-07-18

## 2025-07-18 RX ORDER — DILTIAZEM HYDROCHLORIDE 300 MG/1
300 CAPSULE, COATED, EXTENDED RELEASE ORAL DAILY
Qty: 30 CAPSULE | Refills: 3 | Status: SHIPPED | OUTPATIENT
Start: 2025-07-18

## 2025-07-18 RX ADMIN — METOPROLOL TARTRATE 50 MG: 50 TABLET, FILM COATED ORAL at 08:43

## 2025-07-18 RX ADMIN — ASPIRIN 81 MG: 81 TABLET, CHEWABLE ORAL at 08:43

## 2025-07-18 RX ADMIN — SODIUM CHLORIDE, PRESERVATIVE FREE 10 ML: 5 INJECTION INTRAVENOUS at 08:44

## 2025-07-18 RX ADMIN — AMLODIPINE BESYLATE 5 MG: 10 TABLET ORAL at 08:43

## 2025-07-18 RX ADMIN — APIXABAN 5 MG: 5 TABLET, FILM COATED ORAL at 08:43

## 2025-07-18 RX ADMIN — ACETAMINOPHEN 650 MG: 325 TABLET ORAL at 08:54

## 2025-07-18 RX ADMIN — DILTIAZEM HYDROCHLORIDE 300 MG: 120 CAPSULE, COATED, EXTENDED RELEASE ORAL at 08:43

## 2025-07-18 ASSESSMENT — PAIN SCALES - GENERAL: PAINLEVEL_OUTOF10: 7

## 2025-07-18 ASSESSMENT — PAIN DESCRIPTION - LOCATION: LOCATION: HEAD

## 2025-07-18 ASSESSMENT — PAIN DESCRIPTION - DESCRIPTORS: DESCRIPTORS: ACHING

## 2025-07-18 NOTE — PLAN OF CARE
Problem: Chronic Conditions and Co-morbidities  Goal: Patient's chronic conditions and co-morbidity symptoms are monitored and maintained or improved  7/18/2025 0504 by Zachary Olivera RN  Outcome: Progressing  7/17/2025 1544 by Faiza Larios RN  Outcome: Progressing     Problem: Discharge Planning  Goal: Discharge to home or other facility with appropriate resources  7/18/2025 0504 by Zachary Olivera RN  Outcome: Progressing  7/17/2025 1544 by Faiza Larios RN  Outcome: Progressing     Problem: Pain  Goal: Verbalizes/displays adequate comfort level or baseline comfort level  7/18/2025 0504 by Zachary Olivera RN  Outcome: Progressing  7/17/2025 1544 by Faiza Larios RN  Outcome: Progressing     Problem: ABCDS Injury Assessment  Goal: Absence of physical injury  7/18/2025 0504 by Zachary Olivera RN  Outcome: Progressing  7/17/2025 1544 by Faiza Larios RN  Outcome: Progressing     Problem: Safety - Adult  Goal: Free from fall injury  7/18/2025 0504 by Zachary Olivera RN  Outcome: Progressing  7/17/2025 1544 by Faiza Larios RN  Outcome: Progressing      Please call & inform patient:  1.  Electrolytes, liver and kidney function are normal  2.  Red and white cell counts normal without sign of anemia  3.  INR normal  Okay to proceed with surgery as planned, please send copy of labs, office note, and EKG to surgical team  Thank you,  CECILE Casey

## 2025-07-18 NOTE — PROGRESS NOTES
CLINICAL PHARMACY NOTE: MEDS TO BEDS    Total # of Prescriptions Filled: 2   The following medications were delivered to the patient:  Metoprolol Tartrate 50mg  Furosemide 20mg    Additional Documentation: delivered to patient in room 425 7/18 at 1:54pm. No co-pay.

## 2025-07-18 NOTE — PLAN OF CARE
- Spoke with Dr. Sousa regarding case, mitral valve surgery is not warranted upon this admission and can be done outpatient.   - Patient to follow-up with CTS outpatient office as discussed with him.  -Message sent to office to schedule appointment      CARY Petit - CNP'

## 2025-07-18 NOTE — PROGRESS NOTES
[facility]  Progress Note                   Date:   7/18/2025  Patient name: Alberto Torres  Date of admission:  7/11/2025 11:20 AM  MRN:   [id number]  YOB: 1970  PCP: No primary care provider on file.    Reason for Admission: Shortness of breath [R06.02]  Paroxysmal atrial fibrillation (HCC) [I48.0]  Bilateral leg edema [R60.0]  Congestive heart failure, unspecified HF chronicity, unspecified heart failure type (HCC) [I50.9]  Acute exacerbation of chronic heart failure (HCC) [I50.9]    Subjective:       Clinical Changes /Abnormalities: Pt seen and examined in the room.  Patient resting in bed. Pt denies any CP or sob today  Labs, vitals and tele reviewed- rate stable     Medications:   Scheduled Meds:   LORazepam  0.5 mg IntraVENous Once    metoprolol tartrate  50 mg Oral BID    [Held by provider] furosemide  40 mg IntraVENous BID    magnesium sulfate  2,000 mg IntraVENous Once    sodium chloride flush  5-40 mL IntraVENous 2 times per day    apixaban  5 mg Oral BID    aspirin  81 mg Oral Daily    atorvastatin  80 mg Oral Nightly    amLODIPine  5 mg Oral Daily    dilTIAZem  300 mg Oral Daily     Continuous Infusions:   sodium chloride       CBC:   Recent Labs     07/16/25  0335 07/17/25 0317 07/18/25  0452   WBC 5.1 5.6 4.8   HGB 14.5 16.1 14.7    350 300     BMP:    Recent Labs     07/16/25  0335 07/17/25 0317 07/18/25  0452    134* 137   K 3.6* 4.0 3.8    97* 101   CO2 27 24 24   BUN 20 21* 20   CREATININE 1.2 1.3* 1.1   GLUCOSE 98 88 129*     Hepatic:  No results for input(s): \"AST\", \"ALT\", \"BILITOT\", \"ALKPHOS\" in the last 72 hours.    Invalid input(s): \"ALB\"    Troponin:   No results for input(s): \"TROPHS\" in the last 72 hours.    BNP: No results for input(s): \"BNP\" in the last 72 hours.  Lipids: No results for input(s): \"CHOL\", \"HDL\" in the last 72 hours.    Invalid input(s): \"LDLCALCU\"  INR:   No results for input(s): \"INR\" in the last 72

## 2025-07-18 NOTE — PROGRESS NOTES
Occupational Therapy    [facility]  Occupational Therapy Not Seen Note    DATE: 2025    NAME: Alberto Torres  MRN: [id number]   : 1970      Patient not seen this date for Occupational Therapy due to:    Patient independent with ADLs and functional tasks with no acute OT needs. Will defer OT evaluation at this time. Please reorder OT if future needs arise.       Electronically signed by Marisol Sanchez OT on 2025 at 1:51 PM

## 2025-07-18 NOTE — PROGRESS NOTES
[facility]  Internal Medicine Teaching Residency Program  Inpatient Daily Progress Note  ______________________________________________________________________________    Patient: Alberto Torres  YOB: 1970   MRN:[id number]    Acct: [id number]     Room: 0425/0425-01  Admit date: 7/11/2025  Today's date: 07/18/25  Number of days in the hospital: 7    SUBJECTIVE   Admitting Diagnosis: Atrial fibrillation with rapid ventricular response (HCC)  CC: Shortness of breath, bilateral lower extremity  Pt examined at bedside. Chart & results reviewed.   -No events overnight    Patient is hemodynamically stable with blood pressure 100/70, afebrile, currently on room air    Morning labs reviewed  CBC, BMP unremarkable    Patient denies chest pain, shortness of breath, blurring of vision nausea, vomiting, abdominal pain, diarrhea    Plan  -Continue GDMT medication as able  -Continue CCB and BB for A-fib  -Plan for outpatient follow-up with cardiothoracic surgery for mitral valve replacement  -Likely discharge today  BRIEF HISTORY       The patient is a pleasant 54 y.o. male with a past medical history of  Atrial fibrillation   Left MCA stroke   Presented with complaint of bilateral leg swelling associated with shortness of breath.  According to the patient started having bilateral lower limb swelling a week ago which was acute in nature and progressive.  He also states that leg swelling with associated shortness of breath.    He denies recent travel, sick contact, trauma to the leg, fever, chills, rigors,.  On arrival to ED patient was on A-fib with RVR, heart rate in 130s.  Patient was started on Cardizem drip.  Initial lab work was concerning for acute CHF with elevated proBNP in 4742, chest x-ray concerning for congestive heart failure.  CT chest was negative for PE however it did show acute right heart failure.  UDS positive for cocaine     Of note patient is a known  mg, Q8H PRN   Or  ondansetron, 4 mg, Q6H PRN  polyethylene glycol, 17 g, Daily PRN  acetaminophen, 650 mg, Q6H PRN   Or  acetaminophen, 650 mg, Q6H PRN  potassium chloride, 40 mEq, PRN   Or  potassium alternative oral replacement, 40 mEq, PRN   Or  potassium chloride, 10 mEq, PRN  magnesium sulfate, 2,000 mg, PRN  labetalol, 10 mg, Q4H PRN  hydrALAZINE, 10 mg, Q6H PRN        Diagnostic Labs:  CBC:   Recent Labs     07/16/25 0335 07/17/25 0317 07/18/25 0452   WBC 5.1 5.6 4.8   RBC 5.38 5.95* 5.48   HGB 14.5 16.1 14.7   HCT 44.5 49.0 45.5   MCV 82.7 82.4* 83.0   RDW 15.4* 15.6* 14.9*    350 300     BMP:   Recent Labs     07/16/25 0335 07/17/25 0317 07/18/25 0452    134* 137   K 3.6* 4.0 3.8    97* 101   CO2 27 24 24   BUN 20 21* 20   CREATININE 1.2 1.3* 1.1     BNP: No results for input(s): \"BNP\" in the last 72 hours.  PT/INR: No results for input(s): \"PROTIME\", \"INR\" in the last 72 hours.  APTT: No results for input(s): \"APTT\" in the last 72 hours.  CARDIAC ENZYMES: No results for input(s): \"CKMB\", \"CKMBINDEX\", \"TROPONINI\" in the last 72 hours.    Invalid input(s): \"CKTOTAL;3\"  FASTING LIPID PANEL:  Lab Results   Component Value Date    CHOL 145 07/09/2024    HDL 46 07/09/2024    TRIG 92 07/09/2024     LIVER PROFILE: No results for input(s): \"AST\", \"ALT\", \"BILIDIR\", \"BILITOT\", \"ALKPHOS\" in the last 72 hours.    Invalid input(s): \"ALB\"   MICROBIOLOGY:   Lab Results   Component Value Date/Time    CULTURE NO GROWTH 6 DAYS 05/18/2015 07:16 PM    CULTURE  05/18/2015 07:16 PM     [facility] [address] [place] [address] [phone]    CULTURE NO GROWTH 6 DAYS 05/18/2015 07:16 PM    CULTURE  05/18/2015 07:16 PM     [address] [phone] ([phone]       Imaging:    CT ABDOMEN PELVIS WO CONTRAST Additional Contrast? None  Result Date: 7/17/2025  1.  Small left inguinal hernia containing fat and fluid.  No evidence of bowel protruding into the hernia. 2.

## 2025-07-18 NOTE — PLAN OF CARE
Problem: Chronic Conditions and Co-morbidities  Goal: Patient's chronic conditions and co-morbidity symptoms are monitored and maintained or improved  7/18/2025 1812 by Faiza Larios RN  Outcome: Completed  7/18/2025 1359 by Faiza Larios RN  Outcome: Progressing  7/18/2025 0504 by Zachary Oilvera RN  Outcome: Progressing     Problem: Discharge Planning  Goal: Discharge to home or other facility with appropriate resources  7/18/2025 1812 by Faiza Larios RN  Outcome: Completed  7/18/2025 1359 by Faiza Larios RN  Outcome: Progressing  7/18/2025 0504 by Zachary Olivera RN  Outcome: Progressing     Problem: Pain  Goal: Verbalizes/displays adequate comfort level or baseline comfort level  7/18/2025 1812 by Faiza Larios RN  Outcome: Completed  7/18/2025 1359 by Faiza Larios RN  Outcome: Progressing  7/18/2025 0504 by Zachary Olivera RN  Outcome: Progressing     Problem: ABCDS Injury Assessment  Goal: Absence of physical injury  7/18/2025 1812 by Faiza Larios RN  Outcome: Completed  7/18/2025 1359 by Faiza Larios RN  Outcome: Progressing  7/18/2025 0504 by Zachary Olivera RN  Outcome: Progressing     Problem: Safety - Adult  Goal: Free from fall injury  7/18/2025 1812 by Faiza Larios RN  Outcome: Completed  7/18/2025 1359 by Faiza Larios RN  Outcome: Progressing  7/18/2025 0504 by Zachary Olivera RN  Outcome: Progressing

## 2025-07-18 NOTE — PROGRESS NOTES
Nutrition Assessment     Type and Reason for Visit: Initial, LOS    Nutrition Recommendations/Plan:   Continue current diet     Malnutrition Assessment:  Malnutrition Status: No malnutrition    Nutrition Assessment:  55 yo M adm A-Fib with RVR. PMHx rviewed. Weight fluctuations per EMR. Tolerating a regular diet with % meal intakes this admission. Pt reports he is going home today. Pt working on restoring SNAP benefits at visit. LBM 7/16.    Nutrition Related Findings:   Meds/labs reviewed. Na 134, Cl 97, BUN 1, Cr 1.3. Wound Type: None    Current Nutrition Therapies:    ADULT DIET; Regular    Anthropometric Measures:  Height: 182.9 cm (6' 0.01\")  Current Body Wt: 76.9 kg (169 lb 8.5 oz)   BMI: 23    Nutrition Diagnosis:   No nutrition diagnosis at this time    Nutrition Interventions:   Food and/or Nutrient Delivery: Continue Current Diet  Nutrition Education/Counseling: No recommendation at this time  Coordination of Nutrition Care: Continue to monitor while inpatient  Plan of Care discussed with: pt    Nutrition Monitoring and Evaluation:   Behavioral-Environmental Outcomes: None Identified  Food/Nutrient Intake Outcomes: Food and Nutrient Intake  Physical Signs/Symptoms Outcomes: Biochemical Data, Nutrition Focused Physical Findings, Weight, GI Status, Fluid Status or Edema    Discharge Planning:    No discharge needs at this time     Marisol Walton RD  Contact: [phone]

## 2025-07-18 NOTE — PLAN OF CARE
Problem: Chronic Conditions and Co-morbidities  Goal: Patient's chronic conditions and co-morbidity symptoms are monitored and maintained or improved  7/18/2025 1359 by Faiza Larios RN  Outcome: Progressing  7/18/2025 0504 by Zachary Olivera RN  Outcome: Progressing     Problem: Discharge Planning  Goal: Discharge to home or other facility with appropriate resources  7/18/2025 1359 by Faiza Larios RN  Outcome: Progressing  7/18/2025 0504 by Zachary Olivera RN  Outcome: Progressing     Problem: Pain  Goal: Verbalizes/displays adequate comfort level or baseline comfort level  7/18/2025 1359 by Faiza Larios RN  Outcome: Progressing  7/18/2025 0504 by Zachary Olivera RN  Outcome: Progressing     Problem: ABCDS Injury Assessment  Goal: Absence of physical injury  7/18/2025 1359 by Faiza Larios RN  Outcome: Progressing  7/18/2025 0504 by Zachary Olivera RN  Outcome: Progressing     Problem: Safety - Adult  Goal: Free from fall injury  7/18/2025 1359 by Faiza Larios RN  Outcome: Progressing  7/18/2025 0504 by Zachary Olivera RN  Outcome: Progressing

## 2025-07-21 NOTE — DISCHARGE SUMMARY
[facility]     Department of Internal Medicine - Staff Internal Medicine Teaching Service    INPATIENT DISCHARGE SUMMARY      Patient Identification:  Alberto Torres is a 54 y.o. male.  :  1970  MRN: [id number]     Acct: [id number]   PCP: No primary care provider on file.  Admit Date:  2025  Discharge date and time: 2025  6:15 PM   Attending Provider: No att. providers found                                     ACTIVE DISCHARGE DIAGNOSES     Hospital Problem Lists:  Principal Problem:    Atrial fibrillation with rapid ventricular response (HCC)  Active Problems:    Paroxysmal atrial fibrillation (HCC)    Acute CHF (congestive heart failure) (HCC)    Polysubstance abuse (HCC)    Primary hypertension    Congestive heart failure (HCC)    Bilateral leg edema  Resolved Problems:    * No resolved hospital problems. *      HOSPITAL STAY     Brief Inpatient course:   Alberto Torres is a 54 y.o. male with a past medical history of atrial fibrillation with RVR and extremity stroke presented with a chief complaint of bilateral leg swelling and shortness of breath the patient had stopped using his regular medication for the past 3 months.  Patient was started on treatment for acute congestive heart failure and given diuretics.  Echo ordered showed ejection fraction of 34%.  Patient had nonsustained V. tach episodes during admission.  During admission the patient had A-fib with RVR.  Initially started on IV Lopressor later on switched to oral Lopressor and diltiazem for rate control.  Diuresis helped with the bilateral leg edema and shortness of breath  Cardiology was on board during admission and TRINY TRINY and left heart catheterization was done during this admission and showed mildly elevated pulmonary pressures and severe mitral regurg and recommendation for CT surgery mitral valve repair or replacement.  CT consulted and advised for outpatient wall replacement.  Cardiology advised

## 2025-07-22 ENCOUNTER — TELEPHONE (OUTPATIENT)
Dept: CARDIOTHORACIC SURGERY | Age: 55
End: 2025-07-22

## 2025-07-22 NOTE — TELEPHONE ENCOUNTER
----- Message from YOSVANY NGUYEN MA sent at 7/22/2025  3:18 PM EDT -----  LVM for patients emergency contacts Giulia and Princess in regards to needing to set up a follow-up appt at the end of August, the number we have for him in the chart is incorrect  ----- Message -----  From: Alicia Kelly APRN - CNP  Sent: 7/17/2025   2:46 PM EDT  To: [facility] Staff    Please schedule for hospital f/u with Lexyar at sometime at end of August.  For mitral valve repair vs. Replace

## 2025-08-27 ENCOUNTER — OFFICE VISIT (OUTPATIENT)
Dept: CARDIOTHORACIC SURGERY | Age: 55
End: 2025-08-27

## 2025-08-27 VITALS
SYSTOLIC BLOOD PRESSURE: 114 MMHG | WEIGHT: 169.4 LBS | HEART RATE: 72 BPM | TEMPERATURE: 97.5 F | OXYGEN SATURATION: 99 % | BODY MASS INDEX: 22.94 KG/M2 | DIASTOLIC BLOOD PRESSURE: 77 MMHG | HEIGHT: 72 IN

## 2025-08-27 DIAGNOSIS — I34.0 MITRAL VALVE INSUFFICIENCY, UNSPECIFIED ETIOLOGY: Primary | ICD-10-CM

## 2025-08-27 PROCEDURE — G8427 DOCREV CUR MEDS BY ELIG CLIN: HCPCS | Performed by: THORACIC SURGERY (CARDIOTHORACIC VASCULAR SURGERY)

## 2025-08-27 PROCEDURE — G8420 CALC BMI NORM PARAMETERS: HCPCS | Performed by: THORACIC SURGERY (CARDIOTHORACIC VASCULAR SURGERY)

## 2025-08-27 RX ORDER — AMOXICILLIN 500 MG/1
500 CAPSULE ORAL 3 TIMES DAILY
COMMUNITY
Start: 2025-08-20 | End: 2025-08-29

## 2025-08-27 ASSESSMENT — ENCOUNTER SYMPTOMS
SHORTNESS OF BREATH: 1
EYES NEGATIVE: 1

## 2025-08-28 RX ORDER — SODIUM CHLORIDE, SODIUM LACTATE, POTASSIUM CHLORIDE, CALCIUM CHLORIDE 600; 310; 30; 20 MG/100ML; MG/100ML; MG/100ML; MG/100ML
INJECTION, SOLUTION INTRAVENOUS CONTINUOUS
Status: CANCELLED | OUTPATIENT
Start: 2025-08-28

## 2025-08-29 ENCOUNTER — HOSPITAL ENCOUNTER (OUTPATIENT)
Dept: PREADMISSION TESTING | Age: 55
Discharge: HOME | End: 2025-09-02
Payer: COMMERCIAL

## 2025-08-29 VITALS
HEIGHT: 72 IN | BODY MASS INDEX: 22.62 KG/M2 | OXYGEN SATURATION: 99 % | DIASTOLIC BLOOD PRESSURE: 73 MMHG | WEIGHT: 167 LBS | SYSTOLIC BLOOD PRESSURE: 106 MMHG | RESPIRATION RATE: 16 BRPM | TEMPERATURE: 98.1 F | HEART RATE: 103 BPM

## 2025-08-29 DIAGNOSIS — I34.0 MITRAL VALVE INSUFFICIENCY, UNSPECIFIED ETIOLOGY: ICD-10-CM

## 2025-08-29 LAB
ABO + RH BLD: NORMAL
ALLEN TEST: POSITIVE
ANION GAP SERPL CALCULATED.3IONS-SCNC: 10 MMOL/L (ref 9–16)
ARM BAND NUMBER: NORMAL
BASOPHILS # BLD: <0.03 K/UL (ref 0–0.2)
BASOPHILS NFR BLD: 0 % (ref 0–2)
BILIRUB UR QL STRIP: NEGATIVE
BLOOD BANK SAMPLE EXPIRATION: NORMAL
BLOOD GROUP ANTIBODIES SERPL: NEGATIVE
BUN SERPL-MCNC: 14 MG/DL (ref 6–20)
CALCIUM SERPL-MCNC: 9.6 MG/DL (ref 8.6–10.4)
CHLORIDE SERPL-SCNC: 105 MMOL/L (ref 98–107)
CLARITY UR: CLEAR
CO2 SERPL-SCNC: 25 MMOL/L (ref 20–31)
COLOR UR: YELLOW
COMMENT: NORMAL
CREAT SERPL-MCNC: 1.2 MG/DL (ref 0.7–1.2)
EOSINOPHIL # BLD: 0.07 K/UL (ref 0–0.44)
EOSINOPHILS RELATIVE PERCENT: 2 % (ref 1–4)
ERYTHROCYTE [DISTWIDTH] IN BLOOD BY AUTOMATED COUNT: 13.9 % (ref 11.8–14.4)
FIO2: 21
GFR, ESTIMATED: 71 ML/MIN/1.73M2
GLUCOSE SERPL-MCNC: 92 MG/DL (ref 74–99)
GLUCOSE UR STRIP-MCNC: NEGATIVE MG/DL
HCT VFR BLD AUTO: 45.4 % (ref 40.7–50.3)
HGB BLD-MCNC: 14.5 G/DL (ref 13–17)
HGB UR QL STRIP.AUTO: NEGATIVE
IMM GRANULOCYTES # BLD AUTO: <0.03 K/UL (ref 0–0.3)
IMM GRANULOCYTES NFR BLD: 0 %
INR PPP: 1.1
KETONES UR STRIP-MCNC: NEGATIVE MG/DL
LEUKOCYTE ESTERASE UR QL STRIP: NEGATIVE
LYMPHOCYTES NFR BLD: 1.41 K/UL (ref 1.1–3.7)
LYMPHOCYTES RELATIVE PERCENT: 36 % (ref 24–43)
MCH RBC QN AUTO: 26.7 PG (ref 25.2–33.5)
MCHC RBC AUTO-ENTMCNC: 31.9 G/DL (ref 28.4–34.8)
MCV RBC AUTO: 83.6 FL (ref 82.6–102.9)
MONOCYTES NFR BLD: 0.34 K/UL (ref 0.1–1.2)
MONOCYTES NFR BLD: 9 % (ref 3–12)
NEUTROPHILS NFR BLD: 53 % (ref 36–65)
NEUTS SEG NFR BLD: 2.07 K/UL (ref 1.5–8.1)
NITRITE UR QL STRIP: NEGATIVE
NRBC BLD-RTO: 0 PER 100 WBC
O2 DELIVERY DEVICE: NORMAL
PARTIAL THROMBOPLASTIN TIME: 28.4 SEC (ref 23–36.5)
PH UR STRIP: 5.5 [PH] (ref 5–8)
PLATELET # BLD AUTO: 243 K/UL (ref 138–453)
PMV BLD AUTO: 9.3 FL (ref 8.1–13.5)
POC HCO3: 25.7 MMOL/L (ref 21–28)
POC O2 SATURATION: 97.2 % (ref 94–98)
POC PCO2: 41 MM HG (ref 35–48)
POC PH: 7.41 (ref 7.35–7.45)
POC PO2: 92.3 MM HG (ref 83–108)
POSITIVE BASE EXCESS, ART: 0.9 MMOL/L (ref 0–3)
POTASSIUM SERPL-SCNC: 4.5 MMOL/L (ref 3.7–5.3)
PROT UR STRIP-MCNC: NEGATIVE MG/DL
PROTHROMBIN TIME: 14.3 SEC (ref 11.7–14.9)
RBC # BLD AUTO: 5.43 M/UL (ref 4.21–5.77)
SAMPLE SITE: NORMAL
SODIUM SERPL-SCNC: 140 MMOL/L (ref 136–145)
SP GR UR STRIP: 1.01 (ref 1–1.03)
UROBILINOGEN UR STRIP-ACNC: NORMAL EU/DL (ref 0–1)
WBC OTHER # BLD: 3.9 K/UL (ref 3.5–11.3)

## 2025-08-29 PROCEDURE — 36415 COLL VENOUS BLD VENIPUNCTURE: CPT

## 2025-08-29 PROCEDURE — 82803 BLOOD GASES ANY COMBINATION: CPT

## 2025-08-29 PROCEDURE — 80048 BASIC METABOLIC PNL TOTAL CA: CPT

## 2025-08-29 PROCEDURE — 86901 BLOOD TYPING SEROLOGIC RH(D): CPT

## 2025-08-29 PROCEDURE — 36600 WITHDRAWAL OF ARTERIAL BLOOD: CPT

## 2025-08-29 PROCEDURE — 85610 PROTHROMBIN TIME: CPT

## 2025-08-29 PROCEDURE — 86923 COMPATIBILITY TEST ELECTRIC: CPT

## 2025-08-29 PROCEDURE — 86900 BLOOD TYPING SEROLOGIC ABO: CPT

## 2025-08-29 PROCEDURE — 85025 COMPLETE CBC W/AUTO DIFF WBC: CPT

## 2025-08-29 PROCEDURE — 81003 URINALYSIS AUTO W/O SCOPE: CPT

## 2025-08-29 PROCEDURE — 86850 RBC ANTIBODY SCREEN: CPT

## 2025-08-29 PROCEDURE — 87641 MR-STAPH DNA AMP PROBE: CPT

## 2025-08-29 PROCEDURE — 85730 THROMBOPLASTIN TIME PARTIAL: CPT

## 2025-08-29 RX ORDER — IBUPROFEN 800 MG/1
800 TABLET, FILM COATED ORAL EVERY 6 HOURS PRN
Status: ON HOLD | COMMUNITY

## 2025-08-29 RX ORDER — LOSARTAN POTASSIUM 25 MG/1
25 TABLET ORAL DAILY
COMMUNITY
Start: 2025-07-06 | End: 2025-08-29

## 2025-08-29 RX ORDER — DILTIAZEM HYDROCHLORIDE 120 MG/1
120 CAPSULE, EXTENDED RELEASE ORAL 2 TIMES DAILY
Status: ON HOLD | COMMUNITY

## 2025-08-29 ASSESSMENT — PAIN DESCRIPTION - DESCRIPTORS: DESCRIPTORS: BURNING

## 2025-08-29 ASSESSMENT — PAIN DESCRIPTION - LOCATION: LOCATION: CHEST;BACK

## 2025-08-29 ASSESSMENT — PAIN DESCRIPTION - FREQUENCY: FREQUENCY: INTERMITTENT

## 2025-08-29 ASSESSMENT — PAIN SCALES - GENERAL: PAINLEVEL_OUTOF10: 3

## 2025-08-30 LAB
MRSA, DNA, NASAL: NEGATIVE
SPECIMEN DESCRIPTION: NORMAL

## 2025-09-03 ENCOUNTER — ANESTHESIA EVENT (OUTPATIENT)
Dept: OPERATING ROOM | Age: 55
End: 2025-09-03
Payer: COMMERCIAL

## 2025-09-04 ENCOUNTER — ANESTHESIA (OUTPATIENT)
Dept: OPERATING ROOM | Age: 55
End: 2025-09-04
Payer: COMMERCIAL

## 2025-09-04 PROBLEM — I34.0 SEVERE MITRAL REGURGITATION: Status: ACTIVE | Noted: 2025-09-04

## 2025-09-04 PROBLEM — Q23.3 MITRAL REGURGITATION, CONGENITAL: Status: ACTIVE | Noted: 2025-09-04

## 2025-09-04 PROCEDURE — 6360000002 HC RX W HCPCS

## 2025-09-04 PROCEDURE — 2500000003 HC RX 250 WO HCPCS

## 2025-09-04 PROCEDURE — 2580000003 HC RX 258: Performed by: PHYSICIAN ASSISTANT

## 2025-09-04 PROCEDURE — 6370000000 HC RX 637 (ALT 250 FOR IP)

## 2025-09-04 PROCEDURE — 2580000003 HC RX 258

## 2025-09-04 RX ORDER — MIDAZOLAM HYDROCHLORIDE 1 MG/ML
INJECTION, SOLUTION INTRAMUSCULAR; INTRAVENOUS
Status: DISCONTINUED | OUTPATIENT
Start: 2025-09-04 | End: 2025-09-04 | Stop reason: SDUPTHER

## 2025-09-04 RX ORDER — DEXTROSE MONOHYDRATE 25 G/50ML
INJECTION, SOLUTION INTRAVENOUS
Status: DISCONTINUED | OUTPATIENT
Start: 2025-09-04 | End: 2025-09-04 | Stop reason: SDUPTHER

## 2025-09-04 RX ORDER — VASOPRESSIN 20 [USP'U]/ML
INJECTION, SOLUTION INTRAMUSCULAR; SUBCUTANEOUS
Status: DISCONTINUED | OUTPATIENT
Start: 2025-09-04 | End: 2025-09-04 | Stop reason: SDUPTHER

## 2025-09-04 RX ORDER — ROCURONIUM BROMIDE 10 MG/ML
INJECTION, SOLUTION INTRAVENOUS
Status: DISCONTINUED | OUTPATIENT
Start: 2025-09-04 | End: 2025-09-04 | Stop reason: SDUPTHER

## 2025-09-04 RX ORDER — EPHEDRINE SULFATE/0.9% NACL/PF 25 MG/5 ML
SYRINGE (ML) INTRAVENOUS
Status: DISCONTINUED | OUTPATIENT
Start: 2025-09-04 | End: 2025-09-04 | Stop reason: SDUPTHER

## 2025-09-04 RX ORDER — PROTAMINE SULFATE 10 MG/ML
INJECTION, SOLUTION INTRAVENOUS
Status: DISCONTINUED | OUTPATIENT
Start: 2025-09-04 | End: 2025-09-04 | Stop reason: SDUPTHER

## 2025-09-04 RX ORDER — HEPARIN SODIUM 1000 [USP'U]/ML
INJECTION, SOLUTION INTRAVENOUS; SUBCUTANEOUS
Status: DISCONTINUED | OUTPATIENT
Start: 2025-09-04 | End: 2025-09-04 | Stop reason: SDUPTHER

## 2025-09-04 RX ORDER — ESMOLOL HYDROCHLORIDE 10 MG/ML
INJECTION INTRAVENOUS
Status: DISCONTINUED | OUTPATIENT
Start: 2025-09-04 | End: 2025-09-04 | Stop reason: SDUPTHER

## 2025-09-04 RX ORDER — SODIUM CHLORIDE 9 MG/ML
INJECTION, SOLUTION INTRAVENOUS
Status: DISCONTINUED | OUTPATIENT
Start: 2025-09-04 | End: 2025-09-04 | Stop reason: SDUPTHER

## 2025-09-04 RX ORDER — LIDOCAINE HYDROCHLORIDE 10 MG/ML
INJECTION, SOLUTION EPIDURAL; INFILTRATION; INTRACAUDAL; PERINEURAL
Status: DISCONTINUED | OUTPATIENT
Start: 2025-09-04 | End: 2025-09-04 | Stop reason: SDUPTHER

## 2025-09-04 RX ORDER — NITROGLYCERIN 20 MG/100ML
INJECTION INTRAVENOUS
Status: DISCONTINUED | OUTPATIENT
Start: 2025-09-04 | End: 2025-09-04 | Stop reason: SDUPTHER

## 2025-09-04 RX ORDER — FENTANYL CITRATE 0.05 MG/ML
INJECTION, SOLUTION INTRAMUSCULAR; INTRAVENOUS
Status: DISCONTINUED | OUTPATIENT
Start: 2025-09-04 | End: 2025-09-04 | Stop reason: SDUPTHER

## 2025-09-04 RX ORDER — CEFAZOLIN SODIUM 1 G/3ML
INJECTION, POWDER, FOR SOLUTION INTRAMUSCULAR; INTRAVENOUS
Status: DISCONTINUED | OUTPATIENT
Start: 2025-09-04 | End: 2025-09-04 | Stop reason: SDUPTHER

## 2025-09-04 RX ORDER — PROPOFOL 10 MG/ML
INJECTION, EMULSION INTRAVENOUS
Status: DISCONTINUED | OUTPATIENT
Start: 2025-09-04 | End: 2025-09-04 | Stop reason: SDUPTHER

## 2025-09-04 RX ADMIN — AMINOCAPROIC ACID 10 G/HR: 250 INJECTION, SOLUTION INTRAVENOUS at 09:59

## 2025-09-04 RX ADMIN — PHENYLEPHRINE HYDROCHLORIDE 100 MCG: 10 INJECTION INTRAVENOUS at 09:34

## 2025-09-04 RX ADMIN — FENTANYL CITRATE 100 MCG: 50 INJECTION INTRAVENOUS at 10:31

## 2025-09-04 RX ADMIN — LIDOCAINE HYDROCHLORIDE 50 MG: 10 INJECTION, SOLUTION EPIDURAL; INFILTRATION; INTRACAUDAL; PERINEURAL at 09:16

## 2025-09-04 RX ADMIN — PHENYLEPHRINE HYDROCHLORIDE 100 MCG: 10 INJECTION INTRAVENOUS at 09:42

## 2025-09-04 RX ADMIN — ROCURONIUM BROMIDE 50 MG: 10 INJECTION, SOLUTION INTRAVENOUS at 09:50

## 2025-09-04 RX ADMIN — EPHEDRINE SULFATE 10 MG: 5 INJECTION INTRAVENOUS at 14:01

## 2025-09-04 RX ADMIN — PHENYLEPHRINE HYDROCHLORIDE 100 MCG: 10 INJECTION INTRAVENOUS at 10:49

## 2025-09-04 RX ADMIN — ROCURONIUM BROMIDE 50 MG: 10 INJECTION, SOLUTION INTRAVENOUS at 11:52

## 2025-09-04 RX ADMIN — SODIUM CHLORIDE, POTASSIUM CHLORIDE, SODIUM LACTATE AND CALCIUM CHLORIDE: 600; 310; 30; 20 INJECTION, SOLUTION INTRAVENOUS at 09:11

## 2025-09-04 RX ADMIN — ROCURONIUM BROMIDE 50 MG: 10 INJECTION, SOLUTION INTRAVENOUS at 10:17

## 2025-09-04 RX ADMIN — PHENYLEPHRINE HYDROCHLORIDE 100 MCG: 10 INJECTION INTRAVENOUS at 09:45

## 2025-09-04 RX ADMIN — Medication 0.02 MCG/KG/MIN: at 10:02

## 2025-09-04 RX ADMIN — VANCOMYCIN HYDROCHLORIDE 1000 MG: 1 INJECTION, POWDER, LYOPHILIZED, FOR SOLUTION INTRAVENOUS at 09:59

## 2025-09-04 RX ADMIN — FENTANYL CITRATE 100 MCG: 50 INJECTION INTRAVENOUS at 10:20

## 2025-09-04 RX ADMIN — SODIUM CHLORIDE: 9 INJECTION, SOLUTION INTRAVENOUS at 09:50

## 2025-09-04 RX ADMIN — PROPOFOL 30 MG: 10 INJECTION, EMULSION INTRAVENOUS at 10:38

## 2025-09-04 RX ADMIN — ESMOLOL HYDROCHLORIDE 30 MG: 100 INJECTION, SOLUTION INTRAVENOUS at 09:28

## 2025-09-04 RX ADMIN — PHENYLEPHRINE HYDROCHLORIDE 100 MCG: 10 INJECTION INTRAVENOUS at 10:51

## 2025-09-04 RX ADMIN — PHENYLEPHRINE HYDROCHLORIDE 100 MCG: 10 INJECTION INTRAVENOUS at 13:15

## 2025-09-04 RX ADMIN — ROCURONIUM BROMIDE 50 MG: 10 INJECTION, SOLUTION INTRAVENOUS at 09:16

## 2025-09-04 RX ADMIN — PHENYLEPHRINE HYDROCHLORIDE 100 MCG: 10 INJECTION INTRAVENOUS at 09:56

## 2025-09-04 RX ADMIN — FENTANYL CITRATE 100 MCG: 50 INJECTION INTRAVENOUS at 10:30

## 2025-09-04 RX ADMIN — PHENYLEPHRINE HYDROCHLORIDE 50 MCG: 10 INJECTION INTRAVENOUS at 10:43

## 2025-09-04 RX ADMIN — PROTAMINE SULFATE 5 MG: 10 INJECTION, SOLUTION INTRAVENOUS at 13:06

## 2025-09-04 RX ADMIN — PROPOFOL 50 MG: 10 INJECTION, EMULSION INTRAVENOUS at 10:36

## 2025-09-04 RX ADMIN — MIDAZOLAM 2 MG: 1 INJECTION INTRAMUSCULAR; INTRAVENOUS at 12:14

## 2025-09-04 RX ADMIN — PHENYLEPHRINE HYDROCHLORIDE 100 MCG: 10 INJECTION INTRAVENOUS at 09:30

## 2025-09-04 RX ADMIN — FENTANYL CITRATE 100 MCG: 50 INJECTION INTRAVENOUS at 10:28

## 2025-09-04 RX ADMIN — NITROGLYCERIN 30 MCG: 20 INJECTION INTRAVENOUS at 10:34

## 2025-09-04 RX ADMIN — DEXTROSE MONOHYDRATE 20 G: 25 INJECTION, SOLUTION INTRAVENOUS at 12:24

## 2025-09-04 RX ADMIN — PROPOFOL 40 MCG/KG/MIN: 10 INJECTION, EMULSION INTRAVENOUS at 13:42

## 2025-09-04 RX ADMIN — PHENYLEPHRINE HYDROCHLORIDE 50 MCG: 10 INJECTION INTRAVENOUS at 10:45

## 2025-09-04 RX ADMIN — ESMOLOL HYDROCHLORIDE 10 MG: 100 INJECTION, SOLUTION INTRAVENOUS at 10:39

## 2025-09-04 RX ADMIN — PHENYLEPHRINE HYDROCHLORIDE 150 MCG: 10 INJECTION INTRAVENOUS at 13:27

## 2025-09-04 RX ADMIN — EPHEDRINE SULFATE 10 MG: 5 INJECTION INTRAVENOUS at 13:29

## 2025-09-04 RX ADMIN — PHENYLEPHRINE HYDROCHLORIDE 100 MCG: 10 INJECTION INTRAVENOUS at 10:22

## 2025-09-04 RX ADMIN — PHENYLEPHRINE HYDROCHLORIDE 100 MCG: 10 INJECTION INTRAVENOUS at 10:02

## 2025-09-04 RX ADMIN — FENTANYL CITRATE 100 MCG: 50 INJECTION INTRAVENOUS at 13:44

## 2025-09-04 RX ADMIN — PHENYLEPHRINE HYDROCHLORIDE 100 MCG: 10 INJECTION INTRAVENOUS at 10:47

## 2025-09-04 RX ADMIN — PHENYLEPHRINE HYDROCHLORIDE 100 MCG: 10 INJECTION INTRAVENOUS at 09:24

## 2025-09-04 RX ADMIN — MIDAZOLAM 2 MG: 1 INJECTION INTRAMUSCULAR; INTRAVENOUS at 13:14

## 2025-09-04 RX ADMIN — ESMOLOL HYDROCHLORIDE 20 MG: 100 INJECTION, SOLUTION INTRAVENOUS at 10:13

## 2025-09-04 RX ADMIN — ESMOLOL HYDROCHLORIDE 20 MG: 100 INJECTION, SOLUTION INTRAVENOUS at 10:48

## 2025-09-04 RX ADMIN — PHENYLEPHRINE HYDROCHLORIDE 100 MCG: 10 INJECTION INTRAVENOUS at 09:48

## 2025-09-04 RX ADMIN — VASOPRESSIN 3 UNITS: 20 INJECTION, SOLUTION INTRAVENOUS at 12:01

## 2025-09-04 RX ADMIN — CEFAZOLIN 2 G: 1 INJECTION, POWDER, FOR SOLUTION INTRAMUSCULAR; INTRAVENOUS at 10:04

## 2025-09-04 RX ADMIN — PHENYLEPHRINE HYDROCHLORIDE 100 MCG: 10 INJECTION INTRAVENOUS at 10:17

## 2025-09-04 RX ADMIN — MIDAZOLAM 2 MG: 1 INJECTION INTRAMUSCULAR; INTRAVENOUS at 09:11

## 2025-09-04 RX ADMIN — FENTANYL CITRATE 100 MCG: 50 INJECTION INTRAVENOUS at 09:40

## 2025-09-04 RX ADMIN — PHENYLEPHRINE HYDROCHLORIDE 100 MCG: 10 INJECTION INTRAVENOUS at 13:17

## 2025-09-04 RX ADMIN — HEPARIN SODIUM 27000 UNITS: 1000 INJECTION, SOLUTION INTRAVENOUS; SUBCUTANEOUS at 10:40

## 2025-09-04 RX ADMIN — EPHEDRINE SULFATE 5 MG: 5 INJECTION INTRAVENOUS at 13:49

## 2025-09-04 RX ADMIN — ROCURONIUM BROMIDE 20 MG: 10 INJECTION, SOLUTION INTRAVENOUS at 13:14

## 2025-09-04 RX ADMIN — PHENYLEPHRINE HYDROCHLORIDE 50 MCG: 10 INJECTION INTRAVENOUS at 10:42

## 2025-09-04 RX ADMIN — FENTANYL CITRATE 100 MCG: 50 INJECTION INTRAVENOUS at 10:04

## 2025-09-04 RX ADMIN — VASOPRESSIN 3 UNITS: 20 INJECTION, SOLUTION INTRAVENOUS at 13:01

## 2025-09-04 RX ADMIN — INSULIN HUMAN 10 UNITS: 100 INJECTION, SOLUTION PARENTERAL at 12:24

## 2025-09-04 RX ADMIN — PROTAMINE SULFATE 295 MG: 10 INJECTION, SOLUTION INTRAVENOUS at 13:09

## 2025-09-04 RX ADMIN — PROPOFOL 150 MG: 10 INJECTION, EMULSION INTRAVENOUS at 09:16

## 2025-09-04 RX ADMIN — FENTANYL CITRATE 100 MCG: 50 INJECTION INTRAVENOUS at 09:16

## 2025-09-04 RX ADMIN — VASOPRESSIN 0.05 UNITS/MIN: 20 INJECTION, SOLUTION INTRAVENOUS at 12:02

## 2025-09-04 RX ADMIN — PHENYLEPHRINE HYDROCHLORIDE 100 MCG: 10 INJECTION INTRAVENOUS at 09:39

## 2025-09-04 RX ADMIN — FENTANYL CITRATE 200 MCG: 50 INJECTION INTRAVENOUS at 10:36

## 2025-09-05 PROBLEM — Z86.73 H/O: STROKE: Status: ACTIVE | Noted: 2025-09-05

## 2025-09-05 PROBLEM — I42.8 NON-ISCHEMIC CARDIOMYOPATHY (HCC): Status: ACTIVE | Noted: 2025-09-05

## 2025-09-05 PROBLEM — Z95.2 S/P MVR (MITRAL VALVE REPLACEMENT): Status: ACTIVE | Noted: 2025-09-05

## 2025-09-05 LAB
ABO/RH: NORMAL
ANTIBODY SCREEN: NEGATIVE
ARM BAND NUMBER: NORMAL
BLOOD BANK DISPENSE STATUS: NORMAL
BLOOD BANK DISPENSE STATUS: NORMAL
BLOOD BANK SAMPLE EXPIRATION: NORMAL
BPU ID: NORMAL
BPU ID: NORMAL
COMPONENT: NORMAL
COMPONENT: NORMAL
CROSSMATCH RESULT: NORMAL
CROSSMATCH RESULT: NORMAL
TRANSFUSION STATUS: NORMAL
TRANSFUSION STATUS: NORMAL
UNIT DIVISION: 0
UNIT DIVISION: 0

## (undated) DEVICE — Device

## (undated) DEVICE — STRAP ARMBRD W1.5XL32IN FOAM STR YET SFT W/ HK AND LOOP

## (undated) DEVICE — TRAY SURG CUST CRD CATH TOLEDO

## (undated) DEVICE — INTRODUCER SHTH 6FR L11CM 0.038IN STD SIDEPRT EXTN 3 W

## (undated) DEVICE — GUIDEWIRE 35/260/FC/PTFE/3J: Brand: GUIDEWIRE

## (undated) DEVICE — CATHETER ANGIO 6FR L100CM DIA0.056IN FR4 CRV VASC ACCS EXPO

## (undated) DEVICE — GUIDEWIRE VASC L150CM DIA0.025IN TIP DIA3MM L7MM INTVASC S

## (undated) DEVICE — CATHETER PRESSURE WEDGE BLLN 6FRX110CM

## (undated) DEVICE — CATHETER DIAG 6FR L100CM LUMN ID0.056IN JL4 CRV 0 SIDE H

## (undated) DEVICE — KIT MICRO INTRO 4FR STIFF 40CM NIGHTENALL TUNGSTEN 7SMT